# Patient Record
Sex: FEMALE | Race: WHITE | NOT HISPANIC OR LATINO | Employment: UNEMPLOYED | URBAN - METROPOLITAN AREA
[De-identification: names, ages, dates, MRNs, and addresses within clinical notes are randomized per-mention and may not be internally consistent; named-entity substitution may affect disease eponyms.]

---

## 2017-04-13 ENCOUNTER — HISTORICAL (OUTPATIENT)
Dept: LAB | Facility: HOSPITAL | Age: 57
End: 2017-04-13

## 2017-10-20 ENCOUNTER — HISTORICAL (OUTPATIENT)
Dept: LAB | Facility: HOSPITAL | Age: 57
End: 2017-10-20

## 2018-01-23 ENCOUNTER — HISTORICAL (OUTPATIENT)
Dept: CARDIOLOGY | Facility: HOSPITAL | Age: 58
End: 2018-01-23

## 2018-02-20 ENCOUNTER — HOSPITAL ENCOUNTER (OUTPATIENT)
Dept: MEDSURG UNIT | Facility: HOSPITAL | Age: 58
End: 2018-02-21
Attending: INTERNAL MEDICINE | Admitting: INTERNAL MEDICINE

## 2018-08-22 ENCOUNTER — HISTORICAL (OUTPATIENT)
Dept: RADIOLOGY | Facility: HOSPITAL | Age: 58
End: 2018-08-22

## 2018-09-07 ENCOUNTER — HISTORICAL (OUTPATIENT)
Dept: LAB | Facility: HOSPITAL | Age: 58
End: 2018-09-07

## 2018-10-03 ENCOUNTER — HISTORICAL (OUTPATIENT)
Dept: RADIOLOGY | Facility: HOSPITAL | Age: 58
End: 2018-10-03

## 2018-10-12 ENCOUNTER — HISTORICAL (OUTPATIENT)
Dept: SURGERY | Facility: HOSPITAL | Age: 58
End: 2018-10-12

## 2018-10-15 ENCOUNTER — HISTORICAL (OUTPATIENT)
Dept: ANESTHESIOLOGY | Facility: HOSPITAL | Age: 58
End: 2018-10-15

## 2019-07-17 ENCOUNTER — HOSPITAL ENCOUNTER (OUTPATIENT)
Dept: ADMINISTRATIVE | Facility: HOSPITAL | Age: 59
End: 2019-07-18
Attending: INTERNAL MEDICINE | Admitting: INTERNAL MEDICINE

## 2019-08-02 ENCOUNTER — HISTORICAL (OUTPATIENT)
Dept: CARDIOLOGY | Facility: HOSPITAL | Age: 59
End: 2019-08-02

## 2019-10-15 ENCOUNTER — HISTORICAL (OUTPATIENT)
Dept: RADIOLOGY | Facility: HOSPITAL | Age: 59
End: 2019-10-15

## 2019-11-22 ENCOUNTER — HISTORICAL (OUTPATIENT)
Dept: RADIOLOGY | Facility: HOSPITAL | Age: 59
End: 2019-11-22

## 2020-07-10 ENCOUNTER — HISTORICAL (OUTPATIENT)
Dept: ADMINISTRATIVE | Facility: HOSPITAL | Age: 60
End: 2020-07-10

## 2020-07-17 ENCOUNTER — HISTORICAL (OUTPATIENT)
Dept: ADMINISTRATIVE | Facility: HOSPITAL | Age: 60
End: 2020-07-17

## 2020-09-21 ENCOUNTER — HISTORICAL (OUTPATIENT)
Dept: RADIOLOGY | Facility: HOSPITAL | Age: 60
End: 2020-09-21

## 2021-07-20 ENCOUNTER — HISTORICAL (OUTPATIENT)
Dept: RADIOLOGY | Facility: HOSPITAL | Age: 61
End: 2021-07-20

## 2021-07-22 ENCOUNTER — HOSPITAL ENCOUNTER (OUTPATIENT)
Dept: MEDSURG UNIT | Facility: HOSPITAL | Age: 61
End: 2021-07-23
Attending: INTERNAL MEDICINE | Admitting: INTERNAL MEDICINE

## 2022-04-09 ENCOUNTER — HISTORICAL (OUTPATIENT)
Dept: ADMINISTRATIVE | Facility: HOSPITAL | Age: 62
End: 2022-04-09

## 2022-04-25 VITALS
BODY MASS INDEX: 38.67 KG/M2 | OXYGEN SATURATION: 98 % | HEIGHT: 62 IN | DIASTOLIC BLOOD PRESSURE: 76 MMHG | WEIGHT: 210.13 LBS | SYSTOLIC BLOOD PRESSURE: 114 MMHG

## 2022-04-30 NOTE — DISCHARGE SUMMARY
Patient:   Lana Martel            MRN: [id number]            FIN: [id number]               Age:   60 years     Sex:  Female     :  1960   Associated Diagnoses:   None   Author:   Reanna Jung NP      Please see same day H & P as discharge summary.

## 2022-04-30 NOTE — H&P
CHIEF COMPLAINT:  Upper abdominal pain.    HISTORY OF PRESENT ILLNESS:  This is a 58-year-old female patient who was seen and evaluated by Dr. Carlos A Zamora and worked up because of upper abdominal pain.  Patient had ultrasound of the abdomen done on 10/03/2018, which showed cholelithiasis.  Common bile duct is normal size.  No other upper abdominal pathology was noted.  Since patient was symptomatic, patient was referred to me for cholecystectomy.  Patient has multiple medical problems, including hypertensive cardiovascular disease, hyperlipidemia, gastroesophageal reflux disease, coronary artery disease.  She gives a history of supraventricular tachycardia in the past; she underwent cardiac ablation for the same.  Also had coronary artery stent insertion and  angioplasty in the past.    MEDICATIONS:  She is on multiple medications at home, including atorvastatin, Xanax, citalopram, Lasix, lansoprazole, levothyroxine, loratadine, Singulair, nitroglycerin sublingual, potassium chloride, ranolazine, Xarelto, Carafate, trazodone, nebivolol, etc.    PAST HISTORY:  She gives a history of multiple surgical procedures including tonsillectomy, appendectomy, hysterectomy, anterior cruciate ligament repair on the right knee, coronary artery angioplasty and stent insertion, cardiac ablation for supraventricular tachycardia, etc.    ALLERGIES:  SHE IS ALLERGIC TO PENICILLIN, CELEBREX, HYDROCHLOROTHIAZIDE, SULFONAMIDES, COMPAZINE, ETC.      SOCIAL HISTORY:  She does not smoke.  No history of alcohol abuse.  No history of illicit drug use.    FAMILY HISTORY:  Hypertension present.    REVIEW OF SYSTEMS:  RESPIRATORY:  No shortness of breath.   CARDIOVASCULAR:  Known hypertension.  Known to have coronary artery disease.  History of supraventricular tachycardia in the past.     NEURO:  No seizures.   ENDOCRINE:  No diabetes mellitus.  Known to have hypothyroidism.   PSYCHIATRIC:  No complaints.     HEMATOLOGIC:  Because of  Xarelto, she bleeds easily.   MUSCULAR:  She has vague joint pains.   GENITOURINARY: No complaints.   GASTROINTESTINAL:  Patient has upper abdominal pain.  Known to have cholelithiasis.  Also known to have gastroesophageal reflux disease.    PHYSICAL EXAMINATION:  GENERAL:  Condition of the patient is satisfactory.   VITAL SIGNS:  Stable.   HEENT:  No scalp lesion.  Oral cavity and throat normal.     NECK:  Supple.  Thyroid:  No nodules.  Trachea central.  No cervical or supraclavicular nodes noted.  No carotid bruits.  Jugular venous pressure normal.   CHEST:  Air entry equal on both sides.     LUNGS:  Clear.  No rales or wheezing.     HEART:  Regular.  No murmur.  No gallop.     BREASTS:  No lump palpable.  Axilla:  No nodes enlarged.     ABDOMEN:  Soft.  Mild tenderness in the right upper quadrant.  Liver and spleen not enlarged.  No masses palpable.  No area of tenderness.  Good bowel sounds.  No costovertebral angle tenderness.  Hernia sites normal.   EXTREMITIES:  Femoral, popliteal pulses, and pedal pulses feeble but present.  No varicose veins.  No calf muscle tenderness.  Upper extremities normal.  Spine is normal.   NEUROLOGIC:  Status normal.    CLINICAL IMPRESSION:  Chronic cholecystitis with cholelithiasis.    PLAN:  Patient is scheduled for cholecystectomy.        VIJI/MAIDA   DD: 10/15/2018 0711   DT: 10/15/2018 0746  Job # [id number]    cc: SAVANNAH Mcconnell M.D.

## 2022-04-30 NOTE — OP NOTE
Patient:   Lana Martel            MRN: [id number]            FIN: [id number]               Age:   59 years     Sex:  Female     :  1960   Associated Diagnoses:   None   Author:   El Mcmillan MD      Preoperative Diagnosis: Cataract Right eye    Postoperative Diagnosis: Cataract Right eye    Procedure: Phacoemulsification with intaocular lens implantations Right eye    Surgeon: El Mcmillan MD    Assistant: Patience Ferris [facility]    Anesthesia MAC    Complications: None    The patient was brought into the operating suite, where the patient was correctly identified as was the operative eye via timeout.  The patient was prepped and draped in a sterile ophthalmic fashion.  A lid speculum was placed in the operative eye and the microscope was brought into place.  A 1.0mm paracentesis was then made at (12) o'clock.  The anterior chamber was filled with EndoCoat  A (temporal) clear corneal incision was made with a 2.4 mm keratome.  A 6 mm corneal marking ring was used to kate the cornea centered over the visual axis.  A 5.00 mm continuous curvilinear capsulorrhexis was fashioned using a cystotome and microcapsular forceps.  Hydrodissection and hydrodelineation was performed with unpreserved 1% Xylocaine.  The nucleus was then phacoemulsified with the Abbott phacoemulsification hand-piece with a total of (0) EFX.  The cortex was then removed with the I/A hand-piece. The lens model ([id number]) with a power of (21.5) was placed in the capsular bag.  The Healon was then removed from the eye with the I/A hand piece.  The anterior chamber was inflated and the wounds were hydrated with BSS.  The wounds were checked with Weck-Stephany sponges and found to be watertight.  The lid speculum was removed and topical antibiotics were placed on the operative eye.  The patient was brought to PACU in good condition.

## 2022-04-30 NOTE — DISCHARGE SUMMARY
Patient:   Lana Martel            MRN: [id number]            FIN: [id number]               Age:   57 years     Sex:  Female     :  1960   Associated Diagnoses:   Potential stroke; Acute cerebrovascular accident (CVA)   Author:   Tiffany CAZARES, Elizabeth Merritt      Results Review   MRI:   Impression:     Mild multifocal cerebral white matter T2 hyperintensities have  progressed slightly since the previous exam. These probably reflect  changes of chronic ischemia but nonspecific demyelination is not  excluded. No acute abnormality is present.      CTA HEAD AND NECK: Negative study    Carotid ultrasound:  Right with no hemodynamically significant stenosis in internal carotid artery  Left internal carotid artery with less than 50% stenosis  Patent and antegrade vertebral flow.     CT HEAD:   IMPRESSION: No acute abnormalities are identified.         Discharge Information      Discharge Summary Information   Admitted  2018   Discharged  2018   Consulting physician     El Greenwood MD     Admitting diagnosis (r/o CVA)   Discharge diagnosis     Potential stroke (PNED [id number]).     Acute cerebrovascular accident (CVA) (GPI39-OU I63.9).        Physical Examination      Vital Signs (last 24 hrs)_____  Last Charted___________  Temp Oral     36.6 DegC  ( 07:08)  Heart Rate Peripheral   82 bpm  ( 07:08)  Resp Rate         21 br/min  ( 07:08)  SBP      129 mmHg  ( 07:08)  DBP      86 mmHg  ( 07:08)  SpO2      97 %  ( 07:08)  Weight      98.4 kg  ( 11:47)     General:  Alert, oriented X 3, no acute distress.    Skin:  Warm, pink, intact, moist, no rash.    Head:  Normocephalic, atraumatic.    Cardiovascular:  Regular rate and rhythm, No murmur, Normal peripheral perfusion, No edema.    Respiratory:  Lungs are clear to auscultation, respirations are non-labored, breath sounds are equal, Symmetrical chest wall expansion.     Gastrointestinal:  Soft, Nontender, Non distended, Normal bowel sounds.    Musculoskeletal:  Normal ROM, normal strength.    Neurological:  Alert and oriented to person, place, time, and situation, No focal neurological deficit observed, normal speech observed, Cranial nerves II - XII: Intact  Psychiatric:  Cooperative, appropriate mood & affect, normal judgment.           Hospital Course   The patient is a 57-year-old  female with a history of previous TIAs, coronary artery disease status post PTCA, SVT that presented to the ED via EMS due to strokelike symptoms that started about 45 minutes prior.  Symptoms involved tongue and lips tingling with numbness.  Apparently patient was trying to have a bowel movement and had to strain some since she had been constipated.  Other complaints include unsteady gait with left-sided weakness.  She also reported shortness of breath, dizziness and rhinorrhea.  Denied any chest pain or headache.  Patient is anticoagulated with Xarelto.  By the time she presented to the emergency room, her symptoms are completely resolved.  Patient was already evaluated by neurology, not a candidate for TPA secondary to being anticoagulated.  Patient was  admitted to the hospitalist service to complete the CVA workup with MRI, CTA, carotid ultrasound and echocardiogram.  Echocardiogram results are still pending however rest of the test results were essentially negative except for some increase in the T2 hyperintensities.  Patient was evaluated by neurology, will await further recommendations from them before discharging the patient home.  Her symptoms have completely resolved and she is eager to go home at this time.  We gave her aspirin on admission.  Patient is on Xarelto at home.  Will await recommendations with the patient will need aspirin on discharge.  Overall patient's other medical comorbidities have remained stable.  She will be discharged home if cleared by neurology.  Time  spent: 35 minutes           Discharge Plan   Discharge Summary Plan   Discharge Status: improved.     Discharge instructions given: to patient.     Discharge disposition: discharge to home (into the care of family member, self care).     Prescriptions: continue same medications, per med rec sheet.     Orders     Orders   Admit/Transfer/Discharge:  Discharge (Order): Home, if Ok with Dr Greenwood.        CVA vs TIA  Stroke protocol, completed an MRI negative for any acute events.  Some increase in the T2 hypodensity, will await further recommendations from neurology, possible discharge to home later  Therapy consult    Slurred speechpossible TIA; symptoms resolved    HTN  Patient outside the permissive hypertension window, will resume home blood pressure medications    CADs/p stent placement  Patient was on aspirin and Plavix at home, now on Xarelto   ASA was added, will await neurology recommendations if she needs it on discharge    Hypothyroidism  Levothyroxine will be continued      Education and Follow-up   Counseled: patient, family, regarding diagnosis, regarding treatment, regarding medications.     Discharge Planning: Follow up with primary care provider; El Greenwood.

## 2022-04-30 NOTE — ED PROVIDER NOTES
"   Patient:   Lana Bond            MRN: [id number]            FIN: [id number]               Age:   58 years     Sex:  Female     :  1960   Associated Diagnoses:   Chest pain; History of coronary artery disease; Dyspnea   Author:   Peter Muir MD      Basic Information   Time seen: Date & time 2019 11:11:00.   History source: Patient.   Arrival mode: Private vehicle.   History limitation: None.   Additional information: Patient's physician(s): Simin Noonan MD.      History of Present Illness   The patient presents with chest pain, This is a 58-year-old female who presents with chest pain which she attributes to possible reflux, intermittent, for the past few months with shortness of breath starting last week.  Patient does have a history of cardiac stents. and     I, Dr. Muir, assumed care of this patient at 1133.    59 y/o CF with hx of CAD, GERD, HTN, HLD and who is currently on Xarelto, presents to the ED c/o intermittent chest pain radiating to the L shoulder w/ SoB that has been ongoing since last week. Pt reports that she is also experiencing epigastric pain, which she believes is being caused by her reflux. She states that her SoB has been intermittent for the last few months, but was worse this morning while driving to work. Pt states that her SoB comes on randomly but is worse with exertion. She reports that her chest pain lasts between 5-10 mins. Pt is on NTG at home but states that she does not take it due to the way it makes her feel. She notes that she also has a cough but states that it has been ongoing for "months." Previous heart cardiac stent placement in  and has an appointment with Dr. Noonan later in the month. No hx of CHF. Previous TIA x3. .  The onset was Has been ongoing but worse this morning.  The course/duration of symptoms is worsening and fluctuating in intensity.  Location: Generalized chest. Radiating pain: left shoulder. The character of " "symptoms is "Pain".  The degree at onset was moderate.  The degree at maximum was moderate.  The degree at present is none.  The exacerbating factor is none.  The relieving factor is none.  Risk factors consist of coronary artery disease, hypertension and obesity.  Prior episodes: coronary artery disease and gastroesophageal reflux disease.  Therapy today None.  Associated symptoms: shortness of breath.        Review of Systems   Constitutional symptoms:  Weakness, no fever, no chills, no sweats, no fatigue, no decreased activity.    Skin symptoms:  No jaundice, no rash, no pruritus, no abrasions, no breakdown, no burns, no dryness, no petechiae, no lesion.    ENMT symptoms:  No ear pain, no sore throat, no nasal congestion, no sinus pain.    Respiratory symptoms:  Shortness of breath, cough, no orthopnea, no hemoptysis, no stridor, no wheezing.    Cardiovascular symptoms:  Chest pain, central, moderate pain, intermittent, L shoulder, no palpitations, no tachycardia, no syncope, no diaphoresis, no peripheral edema.    Gastrointestinal symptoms:  Abdominal pain, moderate, epigastric, pain, no nausea, no vomiting.    Genitourinary symptoms:  No dysuria, no hematuria.    Musculoskeletal symptoms:  No back pain, no Muscle pain, no Joint pain, no Claudication.    Neurologic symptoms:  No headache, no dizziness, no altered level of consciousness, no numbness, no tingling, no weakness.              Additional review of systems information: All systems reviewed as documented in chart.      Health Status   Allergies:    Allergic Reactions (Selected)  Severity Not Documented  Biaxin- Upset stomach.  CeleBREX- Trouble breathing and swelling.  Compazine- Agitation.  Penicillin- Trouble breathing.  Sulfa drugs- Upset stomach.  Toprol-XL- Hypotension..   Medications:  (Selected)   Documented Medications  Documented  Bystolic 10 mg oral tablet: 10 mg = 1 tab(s), Oral, Daily  Carafate 1 g oral tablet: 1 gm = 1 tab(s), Oral, AC & " HS  Claritin 10 mg oral tablet: 20 mg = 2 tab(s), Oral, Daily  Divigel 1 mg/1 g (0.1%) transdermal gel: 1 linwood, TOP, Daily, to upper thigh  Nitrostat 0.4 mg sublingual tab: 0.4 mg = 1 tab(s), SL, q5min, PRN PRN for chest pain  Ranexa 1000 mg oral tablet, extended release: 1,000 mg = 1 tab(s), Oral, BID  Vitamin D3 5000 intl units oral capsule: 50,000 IntUnit = 10 cap(s), Oral, qSunday, with food  Xarelto 20mg Tablet: 20 mg = 1 tab(s), Oral, qPM  Zofran 8 mg oral tablet: 8 mg = 1 tab(s), Oral, q12hr, PRN PRN as needed for nausea/vomiting  alPRAzolam 0.5 mg oral tablet: 0.5 mg = 1 tab(s), Oral, Once a day (at bedtime), PRN PRN anxiety  atorvastatin 80 mg oral tablet: 80 mg = 1 tab(s), Oral, Daily  cloniDINE 0.1 mg oral tablet: 0.1 mg = 1 tab(s), Oral, TID, PRN PRN hypertension, SBP > 175 or DBP > 95  escitalopram 20 mg oral tablet: 20 mg = 1 tab(s), Oral, Daily  furosemide 40 mg oral tablet: 40 mg = 1 tab(s), Oral, Daily  lansoprazole 30 mg oral DR capsule: 30 mg = 1 cap(s), Oral, Daily  levothyroxine 25 mcg (0.025 mg) oral tablet: 25 mcg = 1 tab(s), Oral, BID  meclizine 25 mg oral tablet: 25 mg = 1 tab(s), Oral, TID, PRN PRN for dizziness  montelukast 10 mg oral TABLET: 10 mg = 1 tab(s), Oral, qPM  potassium chloride 10 mEq oral CAPSULE extended release: 10 mEq = 1 cap(s), Oral, BID  traZODone 100 mg oral tablet: 200 mg = 2 tab(s), Oral, qPM  valsartan 80 mg oral tablet: 80 mg = 1 tab(s), Oral, At Bedtime.      Past Medical/ Family/ Social History   Medical history:    Resolved  Heart murmur (1136801884):  Resolved.  GERD - Gastro-esophageal reflux disease (9815458888):  Resolved.  TIA - Transient ischaemic attack (414841664):  Resolved.  Palpitations (154202261):  Resolved.  EUGENE - Obstructive sleep apnea (7466983611):  Resolved.  HTN - Hypertension (0936864438):  Resolved.  HLD - Hyperlipidemia (014623450):  Resolved.  CAD - Coronary artery disease (9249603467):  Resolved.  Anxiety (99592470):  Resolved.  Depression  (876278842):  Resolved.  SVT - Supraventricular tachycardia (9425030100):  Resolved..   Surgical history:    Tonsillectomy (269978919).  Hysterectomy (551188194).  Appendectomy (241458503).  Foot (14082833).  Eye (675330554).  Ablation (110138929).  PTCA - Percutaneous transluminal coronary angioplasty (4881951288).  ACL repair..   Family history:    No family history items have been selected or recorded..   Social history: Alcohol use: Occasionally, Tobacco use: Denies.      Physical Examination               Vital Signs   Vital Signs   7/17/2019 11:12 CDT      Temperature Oral          36.9 DegC                             Temperature Oral (calculated)             98.42 DegF                             Peripheral Pulse Rate     70 bpm                             Respiratory Rate          18 br/min                             SpO2                      99 %                             Oxygen Therapy            Room air                             Systolic Blood Pressure   146 mmHg  HI                             Diastolic Blood Pressure  84 mmHg  .   Measurements   7/17/2019 11:12 CDT      Weight Dosing             102 kg                             Weight Measured and Calculated in Lbs     224.87 lb                             Weight Estimated          102 kg                             Height/Length Dosing      157 cm                             Height/Length Estimated   157 cm                             Body Mass Index Estimated 41.38 kg/m2  .   Basic Oxygen Information   7/17/2019 11:12 CDT      SpO2                      99 %                             Oxygen Therapy            Room air  .   General:  Alert, no acute distress, obese.    Skin:  Warm, dry, no rash.    Head:  Normocephalic, atraumatic.    Neck:  Supple, trachea midline, no tenderness.    Eye:  Extraocular movements are intact, vision unchanged.    Ears, nose, mouth and throat:  Oral mucosa moist.   Respiratory:  Lungs are clear to auscultation,  respirations are non-labored, breath sounds are equal, Symmetrical chest wall expansion, No wheezing, rales, or rhonchi.    Cardiovascular:  Regular rate and rhythm, No murmur, Normal peripheral perfusion.    Gastrointestinal:  Soft, Nontender, Non distended, Normal bowel sounds, No organomegaly, Obese, Guarding: Negative, Rebound: Negative.    Musculoskeletal:  Normal ROM, normal strength.    Neurological:  Alert and oriented to person, place, time, and situation, No focal neurological deficit observed, normal motor observed.    Psychiatric:  Cooperative, appropriate mood & affect.       Medical Decision Making   Documents reviewed:  Emergency department nurses' notes.   Orders  Launch Orders   Laboratory:  CMP (Order): Stat collect, 7/17/2019 11:13 CDT, Blood, Lab Collect, Print Label By Order Location, 7/17/2019 11:13 CDT  CBC w/ Auto Diff (Order): Stat collect, 7/17/2019 11:13 CDT, Blood, Lab Collect, Print Label By Order Location, 7/17/2019 11:13 CDT  POC BNP iSTAT request (Order): Blood, Stat collect, 7/17/2019 11:13 CDT by Saqib Perez, Lab Collect, Print Label By Order Location  POC iSTAT ER Troponin request (Order): Blood, Stat collect, 7/17/2019 11:13 CDT by Saqib Perez, Lab Collect, Print Label By Order Location  Patient Care:  Saline Lock Insert (Order): 7/17/2019 11:13 CDT  Contact MD for order for Aspirin and NTG. (Order): 7/17/2019 11:13 CDT, Contact MD for order for Aspirin and NTG.  Pulse Oximetry (Order): 7/17/2019 11:13 CDT, Place on pulse oximetry.  Monitor oxygen saturation.  Cardiac Monitoring (Order): 7/17/2019 11:13 CDT, Constant Order, Place on telemetry.  Blood Pressure (Order): 7/17/2019 11:13 CDT, Monitor blood pressure.  Pharmacy:  [facility] (Order): 50 mL, form: Liquid, Oral, Once, first dose 7/17/2019 11:13 CDT, stop date 7/17/2019 11:13 CDT  Radiology:  XR Chest 1 View (Order): Stat, 7/17/2019 11:13 CDT, Chest Pain, None, Patient Bed, Patient  Has IV?, Rad Type, Not Scheduled  Cardiology:  EKG Adult 12 Lead (Order): 7/17/2019 11:13 CDT, Stat, Chest Pain, Patient Bed, Patient Has IV, Standard Precautions, Show to provider upon completion., -1, -1, 7/17/2019 11:13 CDT  Respiratory Therapy:  Oxygen Therapy (Order): 7/17/2019 11:13 CDT, Nasal Cannula, Maintain O2 saturation > 93%., CM Oxygen.   Electrocardiogram:  Time 7/17/2019 11:27:00, rate 73, normal sinus rhythm, No ST-T changes, no ectopy, normal IL & QRS intervals, EP Interp.    Results review:  Lab results : Lab View   7/17/2019 11:55 CDT      POC Troponin              0.00 ng/mL    7/17/2019 11:54 CDT      POC BNP iSTAT             58 pg/mL    7/17/2019 11:40 CDT      Sodium Lvl                140 mmol/L                             Potassium Lvl             4.4 mmol/L                             Chloride                  104 mmol/L                             CO2                       30.0 mmol/L                             Calcium Lvl               9.5 mg/dL                             Glucose Lvl               91 mg/dL                             BUN                       11.0 mg/dL                             Creatinine                0.94 mg/dL                             eGFR-AA                   >60 mL/min/1.73 m2  NA                             eGFR-SHERIF                  >60 mL/min/1.73 m2  NA                             Bili Total                0.4 mg/dL                             Bili Direct               0.10 mg/dL                             Bili Indirect             0.30 mg/dL                             AST                       12 unit/L  LOW                             ALT                       27 unit/L                             Alk Phos                  163 unit/L  HI                             Total Protein             7.3 gm/dL                             Albumin Lvl               3.70 gm/dL                             Globulin                  3.60 gm/dL  HI                              A/G Ratio                 1.0 ratio  LOW                             Troponin-I                <0.02 ng/mL  LOW                             WBC                       10.3 x10(3)/mcL                             RBC                       5.47 x10(6)/mcL  HI                             Hgb                       13.1 gm/dL                             Hct                       43.5 %                             Platelet                  369 x10(3)/mcL                             MCV                       79.5 fL  LOW                             MCH                       23.9 pg  LOW                             MCHC                      30.1 gm/dL  LOW                             RDW                       14.6 %                             MPV                       10.0 fL                             Abs Neut                  5.75 x10(3)/mcL                             Neutro Auto               56 %  NA                             Lymph Auto                32 %  NA                             Mono Auto                 8 %  NA                             Eos Auto                  2 %  NA                             Abs Eos                   0.2 x10(3)/mcL                             Basophil Auto             1 %  NA                             Abs Neutro                5.75 x10(3)/mcL                             Abs Lymph                 3.3 x10(3)/mcL                             Abs Mono                  0.8 x10(3)/mcL                             Abs Baso                  0.1 x10(3)/mcL  .   Radiology results:  Rad Results (ST)  < 12 hrs   Accession: [id number]  Order: XR Chest 1 View  Report Dt/Tm: 07/17/2019 12:29  Report:   CHEST, 1 VIEW     INDICATION: Chest pain.     FINDINGS: Frontal view of the chest was obtained. The cardiac  silhouette is within normal limits for size.    No evidence of lobar  type consolidation, visible pneumothorax, or pleural effusion is seen.  No acute displaced fractures are  visualized.        IMPRESSION:  1. No evidence of lobar type consolidation or acute cardiac  decompensation is visualized.      .      Reexamination/ Reevaluation   Time: 7/17/2019 13:21:00 .   Course: well controlled.      Impression and Plan   Diagnosis   Chest pain (IVJ64-PC R07.9)   History of coronary artery disease (GIU81-XX Z86.79)   Dyspnea (OBQ77-JL R06.00)      Calls-Consults   -  7/17/2019 13:22:00 , Seven NP, okay to admit.    Plan   Condition: Stable.    Disposition: Admit time  7/17/2019 13:23:00, Place in Observation Telemetry Unit.    Counseled: Patient, Regarding diagnosis, Regarding diagnostic results, Regarding treatment plan, Patient indicated understanding of instructions.    Notes: I, Kwasi Winslow, acted solely as a scribe for and in the presence of Dr. Muir who performed the service. , I acknowledged that the documentation on this chart was provided by a scribe on the date of service noted above and that the documentation in the chart accurately reflects work and decisions made by me alone..

## 2022-04-30 NOTE — OP NOTE
Patient:   Lana Martel            MRN: [id number]            FIN: [id number]               Age:   59 years     Sex:  Female     :  1960   Associated Diagnoses:   None   Author:   El Mcmillan MD      Preoperative Diagnosis: Cataract Left eye    Postoperative Diagnosis: Cataract Left eye    Procedure: Phacoemulsification with intaocular lens implantations Left eye    Surgeon: El Mcmillan MD    Assistant: Patience Ferris, [facility]    Anestheisa: MAC    Complications: None    The patient was brought into the operating suite, where the patient was correctly identified as was the operative eye via timeout.  The patient was prepped and draped in a sterile ophthalmic fashion.  A lid speculum was placed in the operative eye and the microscope was brought into place.  A 1.0mm paracentesis was then made at (6) o'clock.  The anterior chamber was filled with Endocoat.  A (temporal) clear corneal incision was made with a 2.4 mm keratome.  A 6 mm corneal marking ring was used to kate the cornea centered over the visual axis.  A 5.00 mm continuous curvilinear capsulorhexis was fashioned using a cystotome and microcapsular forceps.  Hydrodissection and hydrodelineation was performed with upreserved 1% Xylocaine.  The nucleus was then phacoemulsified with the Abbott phacoemulsification hand-piece with a total of (4) EFX.  The cortex was then removed with the I/A hand-piece. The lens model ([id number]) with a power of (24.5) was placed in the capsular bag.  The Helon was then removed from the eye with the I/A hand piece.  The anterior chamber was inflated and the wounds were hydrated with BSS.  The wounds were checked with Weck-Stephany sponges and found to be watertight.  The lid speculum was removed and topical antibiotics were placed on the operative eye.  The patient was brought to PACU in good condition.

## 2022-04-30 NOTE — ED PROVIDER NOTES
"   Patient:   Lana Martel            MRN: [id number]            FIN: [id number]               Age:   60 years     Sex:  Female     :  1960   Associated Diagnoses:   None   Author:   Loyd Leiva MD      Basic Information   Time seen: Date & time 2021 12:20:00.   History source: Patient.   Arrival mode: Private vehicle, walking.   History limitation: None.   Additional information: Patient's physician(s): Simin Noonan MD, Chief Complaint from Nursing Triage Note : Chief Complaint   2021 12:15 CDT      Chief Complaint           CP worsening yesterday. Took nitro yesterday with relief of symptoms. Hx of stent placement and SVT. Radiates down left arm.  .      History of Present Illness   The patient presents with chest pain, 59 yo female presents with chest pain radiating down left arm since this morning. Relieved with nitro. Hx of CAD with stent placement and SVT. ANNELISE Stephens and I, Dr. Leiva, assumed care of this patient at 1552.    61 y/o CF with a history of HTN, HLD, SVT, TIA, and CAD on Xarelto presents to the ED complaining of left-sided chest pain onset yesterday. The pt reports that she has been feeling more SOB on exertion than usual and yesterday, she began experiencing left-sided chest pain that radiated to her left arm. The pt took 1 NTG which helped to relieve some of the pain. Upon exam, the pt complains of mild chest pain. Denies nausea. .  The onset was 1  days ago.  The course/duration of symptoms is constant.  Location: Left chest. Radiating pain: left arm. The character of symptoms is "Pain".  The degree at onset was moderate.  The degree at maximum was moderate.  The degree at present is moderate.  The exacerbating factor is exertion.  The relieving factor is nitroglycerin.  Risk factors consist of coronary artery disease, hypertension and family history of coronary artery disease.  Prior episodes: coronary artery disease.  Therapy today None.  Associated " symptoms: shortness of breath and denies nausea.        Review of Systems   Constitutional symptoms:  No fever, no chills, no sweats, no weakness.    Skin symptoms:  No rash,    Eye symptoms:  No recent vision problems   ENMT symptoms:  No ear pain,    Respiratory symptoms:  Shortness of breath, no orthopnea   Cardiovascular symptoms:  Chest pain, no palpitations   Gastrointestinal symptoms:  No abdominal pain, no nausea, no vomiting, no diarrhea   Genitourinary symptoms:  No dysuria, no hematuria.    Musculoskeletal symptoms:  No back pain, no Muscle pain.    Psychiatric symptoms:  No anxiety, no depression.    Allergy/immunologic symptoms:  No seasonal allergies, no food allergies             Additional review of systems information: All other systems reviewed and otherwise negative.      Health Status   Allergies:    Allergic Reactions (Selected)  Severity Not Documented  Biaxin- Stomach pain and upset stomach.  CeleBREX- Swelling, hives and trouble breathing.  Compazine- Hallucinations and agitation.  Penicillin- Trouble breathing.  Penicillins- Swelling.  Percocet 10/325- Hallucinations.  Percocet 5/325- Hallucinations.  Sulfa drugs- Upset stomach and stomach pain.  Toprol-XL- Hypotension.  Nonallergic Reactions (Selected)  Severity Not Documented  Hydrochlorothiazide- Hypotension..   Medications:  (Selected)   Inpatient Medications  Ordered  citric acid-sodium citrate 334 mg-500 mg/5 mL oral solution: 30 mL, form: Soln, Oral, Once, first dose 08/11/16 14:00:00 CDT, stop date 08/11/16 14:00:00 CDT  Prescriptions  Prescribed  K-Dur 10 oral tablet, extended release: 10 mEq = 1 tab(s), Oral, BID, # 60 tab(s), 0 Refill(s), Pharmacy: [id number]  K-Dur 20 oral tablet, extended release: 20 mEq = 1 tab(s), Oral, BID, with food  One twice a day until all gone then resume your 10 mEq twice a day, # 5 tab(s), 0 Refill(s), Pharmacy: [id number], 157, cm, Height/Length Dosing, 08/02/19  9:22:00 CDT, 96, kg, Weight Dosing, 02...  Xarelto 20mg Tablet: See Instructions, TAKE 1 TABLET BY MOUTH EVERY EVENING, # 30 tab(s), 6 Refill(s), Pharmacy: [facility] [id number]  alPRAzolam 0.5 mg oral tablet: 0.5 mg = 1 tab(s), Oral, Daily, at bedtime, # 90 tab(s), 0 Refill(s), Pharmacy: [facility] [id number]  aspirin 81 mg oral Delayed Release (EC) tablet: 81 mg = 1 tab(s), Oral, Daily, # 30 tab(s), 5 Refill(s), Pharmacy: [facility] [address]  atorvastatin 80 mg oral tablet: See Instructions, TAKE 1 TABLET BY MOUTH DAILY, # 90 tab(s), 1 Refill(s), eRx: [facility] [id number]  escitalopram 20 mg oral tablet: See Instructions, TAKE 1 TABLET BY MOUTH DAILY, # 90 tab(s), 2 Refill(s), Pharmacy: [facility] [address]  lansoprazole 30 mg oral DR capsule: 30 mg = 1 cap(s), Oral, Daily, # 90 cap(s), 3 Refill(s), Pharmacy: [facility] [id number]  montelukast 10 mg oral TABLET: See Instructions, TAKE 1 TABLET BY MOUTH EVERY EVENING, # 30 tab(s), 5 Refill(s), eRx: [facility] [id number]  nebivolol 5 mg oral tablet: 5 mg = 1 tab(s), Oral, Daily, # 90 tab(s), 0 Refill(s), Pharmacy: [facility] [id number]  nitroglycerin 0.4 mg sublingual TAB: See Instructions, DISSOLVE ONE TABLET BY MOUTH EVERY 5 MINUTES AS NEEDED FOR CHEST PAIN, # 275 tab(s), 7 Refill(s), eRx: [facility] [id number]  potassium chloride 10 mEq oral CAPSULE extended release: 10 mEq = 1 cap(s), Oral, BID, take with existing potassium prescription for two days  with food, # 4 cap(s), 0 Refill(s)  ranolazine 1000 mg oral tablet, extended release: 1,000 mg = 1 tab(s), Oral, BID, # 60 tab(s), 6 Refill(s)  sucralfate 1 g oral tablet: 1 gm = 1 tab(s), Oral, QIDACHS, # 360 tab(s), 1 Refill(s), Pharmacy: [facility] [id number]  traZODone 100 mg oral tablet: 200 mg = 2 tab(s), Oral, Once a day (at bedtime), # 180 tab(s), 1 Refill(s), Pharmacy: [facility] [id number]  Documented Medications  Documented  Bystolic 10 mg oral  tablet: 10 mg = 1 tab(s), Oral, Daily  Minivelle 0.05 mg/24 hours twice weekly transdermal film, extended release: TD, Uriarte,W, 0 Refill(s)  Template Non-Formulary Med: 10 mg =, Oral, AC TID, DOMPERIDONE, 0 Refill(s)  Zofran 4 mg oral tablet: 4 mg = 1 tab(s), Oral, Once, PRN PRN nausea/vomiting, # 3 tab(s), 0 Refill(s)  busPIRone 10 mg oral tablet: 20 mg = 2 tab(s), Oral, BID  cloniDINE 0.1 mg oral tablet: 0.1 mg = 1 tab(s), Oral, TID, PRN PRN hypertension, SBP > 175 or DBP > 95  levothyroxine 88 mcg (0.088 mg) oral tablet: Oral, Daily, 0 Refill(s)  loratadine 10 mg oral tablet, disintegratin mg = 2 tab(s), Oral, qPM, # 30 tab(s), 0 Refill(s)  meclizine 12.5 mg oral tablet: 12.5 mg = 1 tab(s), Oral, TID, PRN PRN for nausea/vomiting, # 30 tab(s), 0 Refill(s).      Past Medical/ Family/ Social History   Medical history:    Active  Hypertension (32108235)  GERD (5464506620)  CAD - Coronary artery disease (4624549132)  HLD (hyperlipidemia) ([id number])  Anxiety ([id number])  Sleep apnea (143809923)  Comments:  7/10/2020 CDT 10:17 CDT - Michaela Felix RN  CPAP  Resolved  persistant cough (849268966):  Resolved.  Endometriosis (9500587999):  Resolved.  SVT (88549033):  Resolved.  Asthma ([id number]):  Resolved.  Insomnia (852998928):  Resolved.  Vertigo (3730180363):  Resolved.  Seasonal allergies ([id number]):  Resolved.  Bronchitis (70929944):  Resolved.  CPAP (continuous positive airway pressure) ventilation weaning protocol (0010976117):  Resolved.  TIA (157615388):  Resolved.  Heart murmur (8040944165):  Resolved.  GERD - Gastro-esophageal reflux disease (2971528892):  Resolved.  TIA - Transient ischaemic attack (790988599):  Resolved.  Palpitations (055837086):  Resolved.  EUGENE - Obstructive sleep apnea (3681613046):  Resolved.  HTN - Hypertension (7626151927):  Resolved.  HLD - Hyperlipidemia (258780257):   Resolved.  CAD - Coronary artery disease (9846190026):  Resolved.  Anxiety (56834905):  Resolved.  Depression (556342442):  Resolved.  SVT - Supraventricular tachycardia (6183079665):  Resolved..   Surgical history:    53609 - LT CATARACT W/IOL 1 STA PHACO (Left) on 7/17/2020 at 59 Years.  Comments:  7/17/2020 7:32 Azucena Harmon RN  auto-populated from documented surgical case  98863 - RT CATARACT W/IOL 1 STA PHACO (Right) on 7/10/2020 at 59 Years.  Comments:  7/10/2020 11:12 EDVINT - Susan Alonzo RN  auto-populated from documented surgical case  Cholecystectomy Laparoscopic on 10/15/2018 at 58 Years.  Comments:  10/15/2018 12:49 Nahun Haywood  auto-populated from documented surgical case  Biopsy Gastrointestional on 8/11/2016 at 56 Years.  Comments:  8/11/2016 13:58 Eva Soriano RN  auto-populated from documented surgical case  Esophagogastroduodenoscopy on 8/11/2016 at 56 Years.  Comments:  8/11/2016 13:58 Eva Soriano RN  auto-populated from documented surgical case  91728 - RT SCLERAL BUCKLE (Right) on 8/17/2015 at 55 Years.  Comments:  8/18/2015 16:04 Vanessa Salgado RN  auto-populated from documented surgical case  Left Heart Cath w/COR Angio Ventriculography (None) on 5/14/2015 at 54 Years.  Comments:  5/14/2015 6:53 Lake Grajeda RN  auto-populated from documented surgical case  Bronchoscopy, Diagnostic on 11/22/2013 at 53 Years.  Comments:  11/22/2013 11:38 Ernestina Cutler RRT  auto-populated from documented surgical case  Bronchoscopy with Brushings on 11/22/2013 at 53 Years.  Comments:  11/22/2013 11:38 Ernestina Cutler RRT  auto-populated from documented surgical case  Tonsils and adenoids (445724691).  Hysterectomy and bilateral salpingo-oophorectomy sample (005158785).  R ACL.  Diagnostic lap.  Appendectomy (106625876).  Cardiac Abalation.  Coronary stent site (843240233).  Angioplasty (9083261702).  Hysterectomy  (078685210).  Foot (73046877).  Eye (008652918).  PTCA - Percutaneous transluminal coronary angioplasty (7251118244).  ACL repair..   Family history:    Diabetes mellitus type 2  Father  Sister  Cardiac disease  Father  Mother  Hypertension.  Father  Mother  Sister  Depression.  Mother  Hyperlipidemia.  Mother  .   Social history: Alcohol use: Occasionally, Tobacco use: Denies, Drug use: Denies.      Physical Examination               Vital Signs   Vital Signs   7/22/2021 16:30 CDT      Peripheral Pulse Rate     66 bpm                             Heart Rate Monitored      65 bpm                             Respiratory Rate          19 br/min                             Oxygen Therapy            Room air                             Systolic Blood Pressure   137 mmHg                             Diastolic Blood Pressure  85 mmHg                             Mean Arterial Pressure, Cuff              102 mmHg                             Blood Pressure Location   Left arm                             Blood Pressure Cuff Size  Adult long  .   Measurements   7/22/2021 12:15 CDT      Weight Dosing             102.5 kg                             Weight Measured and Calculated in Lbs     225.97 lb                             Weight Estimated          102.5 kg                             Height/Length Dosing      157 cm                             Height/Length Estimated   157 cm                             Body Mass Index Estimated 41.58 kg/m2  .   Basic Oxygen Information   7/22/2021 16:30 CDT      Oxygen Therapy            Room air  General:  Alert, no acute distress   Neurological:  Alert and oriented to person, place, time, and situation, No focal neurological deficit observed, CN II-XII intact, normal sensory observed, normal motor observed, normal speech observed   Skin:  Warm, pink, intact, moist, no rash   Head:  Normocephalic, atraumatic   Cardiovascular:  Regular rate and rhythm, No murmur, Normal peripheral  perfusion, No edema   Respiratory:  Lungs are clear to auscultation, respirations are non-labored, breath sounds are equal, Symmetrical chest wall expansion.    Musculoskeletal:  Normal ROM, normal strength   Gastrointestinal:  Soft, Nontender, Normal bowel sounds, abdominal distention   Lymphatics:  No lymphadenopathy.   Psychiatric:  Cooperative, appropriate mood & affect, normal judgment      Medical Decision Making   Rationale:  60-year-old presents complaint of chest pain.  Left-sided with radiation down left arm.  History of stent.  Most recent angiography approximately 2 to 3 years ago.  Takes Xarelto daily.  Will obtain labs consistent with evaluation of chest pain.  Plan admission given typical nature of pains..   Documents reviewed:  Emergency department nurses' notes.   Orders  Launch Orders   Laboratory:  POC iSTAT ER Troponin request (Order): Blood, Stat collect, 7/22/2021 12:21 CDT by Albertina Sun, Lab Collect, Print Label By Order Location  Troponin-I (Order): Stat collect, 7/22/2021 12:21 CDT, Blood, Lab Collect, Print Label By Order Location, 7/22/2021 12:21 CDT  CMP (Order): Stat collect, 7/22/2021 12:21 CDT, Blood, Lab Collect, Print Label By Order Location, 7/22/2021 12:21 CDT  CBC w/ Auto Diff (Order): Now collect, 7/22/2021 12:21 CDT, Blood, Lab Collect, Print Label By Order Location, 7/22/2021 12:21 CDT  Radiology:  XR Chest 1 View (Order): Stat, 7/22/2021 12:21 CDT, Chest Pain, None, Patient Bed, Patient Has IV?, Rad Type, Not Scheduled  Cardiology:  EKG (Order): 7/22/2021 12:21 CDT, Stat, Once, 7/22/2021 12:21 CDT, Patient Bed, Patient Has IV, Standard Precautions, -1, -1, 7/22/2021 12:21 CDT, launch Order Profile (Selected)   Inpatient Orders  Ordered  Admit in Outpatient Bedded Status: 07/22/21 16:40:00 CDT, Telemetry with Monitor Tiffany CAZARES, Arpan N, No  Echocardiogram Complete Adult: 07/22/21 16:44:00 CDT, Angina Pectoris (Chest Pain), Stop date 07/22/21 16:44:00 CDT, Patient Bed,  Patient has IV, Standard Precautions  NPO After Midnight: 07/23/21 0:01:00 CDT, CM NPO  Patient Isolation: 07/22/21 16:44:38 CDT, Contact Precautions, Constant Indicator  Patient Isolation: 07/22/21 16:44:38 CDT, Droplet Precautions, Constant Indicator  cloniDINE 0.1 mg oral tablet: 0.1 mg, form: Tab, Oral, TID PRN for hypertension, first dose 07/22/21 16:48:00 CDT, STAT  nebivolol 5 mg oral tablet: 5 mg, form: Tab, Oral, Daily, first dose 07/22/21 16:50:00 CDT, STAT  nitroglycerin 0.4 mg sublingual TAB: 0.4 mg, form: Tab-SL, SL, q5min PRN for chest pain, first dose 07/22/21 16:46:00 CDT, STAT  Ordered (Collected)  Troponin IP 6hr: STAT collect, 07/22/21 17:15:17 CDT, BLOOD, Collected, Stop date 07/22/21 17:15:00 CDT, Lab Collect  Ordered (Dispatched)  COVID-19  IDnow: Stat collect, Nasal, 07/22/21 16:44:00 CDT, Stop date 07/22/21 16:44:00 CDT, Nurse collect  Ordered (Scheduled)  CBC w/ Auto Diff: Routine collect, 07/23/21 5:00:00 CDT, Blood, Stop date 07/23/21 5:00:00 CDT, Lab Collect, Print Label By Order Location, 07/23/21 5:00:00 CDT  CMP: AM Next collect, 07/23/21 5:00:00 CDT, Blood, Stop date 07/23/21 5:00:00 CDT, Lab Collect, Print Label By Order Location, 07/23/21 5:00:00 CDT  Magnesium Level: AM Next collect, 07/23/21 5:00:00 CDT, Blood, Stop date 07/23/21 5:00:00 CDT, Lab Collect, Print Label By Order Location, 07/23/21 5:00:00 CDT  PT: AM Next collect, 07/23/21 5:00:00 CDT, Blood, Stop date 07/23/21 5:00:00 CDT, Lab Collect, Print Label By Order Location, 07/23/21 5:00:00 CDT  PTT: AM Next collect, 07/23/21 5:00:00 CDT, Blood, Stop date 07/23/21 5:00:00 CDT, Lab Collect, in AM, Print Label By Order Location, 07/23/21 5:00:00 CDT  Completed  Automated Diff: NOW collect, 07/22/21 12:28:00 CDT, Blood, Collected, Stop date 07/22/21 12:28:00 CDT, Lab Collect, Print Label By Order Location, 07/22/21 12:21:00 CDT  CBC w/ Auto Diff: NOW collect, 07/22/21 12:28:06 CDT, BLOOD, Collected, Stop date 07/22/21 12:28:00  CDT, Lab Collect  CMP: STAT collect, 21 12:28:06 CDT, BLOOD, Collected, Stop date 21 12:28:00 CDT, Lab Collect  EK21 12:21:00 CDT, Stat, Once, 21 12:21:00 CDT, Patient Bed, Patient Has IV, Standard Precautions, -1, -1, 21 12:21:00 CDT  Estimated Glomerular Filtration Rate: STAT collect, 21 12:28:00 CDT, Blood, Collected, Stop date 21 12:28:00 CDT, Lab Collect, Print Label By Order Location, 21 12:21:00 CDT  Lovenox: 100 mg, form: Injection, Subcutaneous, Once, first dose 21 16:52:00 CDT, stop date 21 16:52:00 CDT, STAT  POC Istat ER Troponin: Blood, Stat collect, Collected, 21 12:57:51 CDT  POC iSTAT ER Troponin request: BLOOD, STAT collect, Collected, 21 12:28:06 CDT, Stop date 21 12:28:00 CDT, Lab Collect, Print Label By Order Location  Troponin-I: STAT collect, 21 12:28:06 CDT, BLOOD, Collected, Stop date 21 12:28:00 CDT, Lab Collect  XR Chest 1 View: Stat, 21 12:21:00 CDT, Chest Pain, None, Patient Bed, Patient Has IV?, Rad Type, Not Scheduled, 21 12:21:00 CDT.    Electrocardiogram:  Time 2021 12:15:00, rate 70, normal sinus rhythm, No ST-T changes, no ectopy, normal [place] & QRS intervals, EP Interp.    Results review:  Lab results : Lab View   2021 13:24 CDT      Est Creat Clearance Ser   54.49 mL/min    2021 12:57 CDT      POC Troponin              0.00 ng/mL    2021 12:28 CDT      Sodium Lvl                138 mmol/L                             Potassium Lvl             5.0 mmol/L                             Chloride                  102 mmol/L                             CO2                       25 mmol/L                             Calcium Lvl               9.0 mg/dL                             Glucose Lvl               96 mg/dL                             BUN                       9.3 mg/dL  LOW                             Creatinine                0.85 mg/dL                              eGFR-AA                   >60  NA                             eGFR-SHERIF                  >60 mL/min/1.73 m2  NA                             Bili Total                0.4 mg/dL                             Bili Direct               0.2 mg/dL                             Bili Indirect             0.20 mg/dL                             AST                       12 unit/L                             ALT                       16 unit/L                             Alk Phos                  148 unit/L                             Total Protein             6.8 gm/dL                             Albumin Lvl               3.4 gm/dL                             Globulin                  3.4 gm/dL                             A/G Ratio                 1.0 ratio  LOW                             Troponin-I                <0.010 ng/mL                             WBC                       9.1 x10(3)/mcL                             RBC                       5.13 x10(6)/mcL                             Hgb                       13.0 gm/dL                             Hct                       41.5 %                             Platelet                  354 x10(3)/mcL                             MCV                       80.9 fL                             MCH                       25.3 pg  LOW                             MCHC                      31.3 gm/dL  LOW                             RDW                       13.6 %                             MPV                       10.9 fL                             Abs Neut                  4.97 x10(3)/mcL                             Neutro Auto               55 %  NA                             Lymph Auto                32 %  NA                             Mono Auto                 8 %  NA                             Eos Auto                  4 %  NA                             Abs Eos                   0.4 x10(3)/mcL                             Basophil Auto             1 %  NA                              Abs Neutro                4.97 x10(3)/mcL                             Abs Lymph                 2.9 x10(3)/mcL                             Abs Mono                  0.7 x10(3)/mcL                             Abs Baso                  0.1 x10(3)/mcL  .   Radiology results:  Rad Results (ST)  < 12 hrs   Accession: [id number]  Order: XR Chest 1 View  Report Dt/Tm: 07/22/2021 12:42  Report:   PORTABLE CHEST X-RAY, ONE FRONTAL VIEW     HISTORY: Chest Pain     COMPARISON:   4/7/2020     FINDINGS:     No focal consolidations, pleural effusions or pneumothoraces.  Cardiomediastinal silhouette within normal limits.  No acute bony pathology.  Surgical clips project over the right upper abdomen.  Soft tissues within normal limits.     IMPRESSION:     No acute thoracic abnormality.  No significant interval change.    .      Impression and Plan   Diagnosis   Unstable angina   Plan   Condition: Stable.    Disposition: Admit time  7/22/2021 16:30:00, Place in Observation Telemetry Unit, Tiffany CAZARES, Arpan ESTRADA    Counseled: Patient, Regarding diagnosis, Regarding diagnostic results, Regarding treatment plan, Patient indicated understanding of instructions.    Notes: I, Shaunna Kebede, acted solely as a scribe for and in the presence of Dr. Leiva who performed the service., I , Loyd Leiva, acknowledge that the documentation on this chart was provided by the scribe on the date of service noted above and that the documentation in the chart accurately reflects work and decisions made by me alone.

## 2022-04-30 NOTE — OP NOTE
PREOPERATIVE DIAGNOSIS:  Chronic cholecystitis with cholelithiasis.    POSTOPERATIVE DIAGNOSIS:  Chronic cholecystitis with cholelithiasis.    OPERATION PERFORMED:  Laparoscopic cholecystectomy.    ANESTHESIA:  General.    PROCEDURE IN DETAIL:  This is a 58-year-old female patient who was brought to the operating room, placed in supine position.  General anesthesia was given.  Conley catheter was introduced.  Abdomen was prepared and draped in usual fashion.  With the patient in Trendelenburg position, a small incision was made at the upper portion of the umbilicus.  Veress needle was introduced through the same to the peritoneal cavity.  CO2 was instilled.  Adequate pressure was obtained.  Subsequently, the Veress needle was removed.  A 10 mm port was introduced into the peritoneal cavity.  Laparoscope was introduced.  Pelvic cavity inspected.  There were plenty of scars noted in the pelvic cavity as well as right lower abdomen.  The patient had a hysterectomy done in the past.  Following this, the patient was placed in reverse Trendelenburg  position.  Upper abdominal ports were introduced under direct vision.  A 10 mm port was introduced through the epigastric area, and two 5 mm ports were introduced through the right side of the abdomen.  Upper abdominal viscera inspected.  Liver appeared normal.  Visualized portions of the stomach, small bowel, and large bowel appeared normal.  Gallbladder was distended.  Fundus was visualized.  No adhesions noted.  Grasping forceps applied to the fundus and neck of the gallbladder.  Dissection was done at the site of the cystic duct and cystic artery.  A lot of fat noted in that area.  This was .  Cystic duct and cystic artery dissected, .  Well identified the critical view of the area obtained.  Common duct was identified.  Cystic duct and cystic artery doubly clipped and severed.  Following this, the gallbladder was removed from the gallbladder fossa  with sharp dissection with Endo Heriberto.  Hemostasis was obtained with diathermy.  Gallbladder was removed through the epigastric port.  Gallbladder had small stone palpated.  Gallbladder fossa thoroughly irrigated with warm saline.  One Surgicel was placed to the gallbladder fossa.  Absolute hemostasis was obtained.  Upper abdominal viscera was further examined.  Following this, the upper abdominal ports were removed under direct vision.  No bleeding noted.  Umbilical port also removed.  Port sites closed in layers.  Fascia approximated with 0 Vicryl interrupted sutures.  Subcutaneous plane approximated with 0 Vicryl interrupted sutures.  Skin approximated with skin clips.  Dressing applied.  She tolerated the procedure well.  She received 900 mg of clindamycin prior to surgery.  She was transferred to recovery room in good condition.        VIJI/MAIDA   DD: 10/15/2018 1339   DT: 10/15/2018 1421  Job # [id number]    cc: SAVANNAH Valadez M.D.

## 2022-04-30 NOTE — ED PROVIDER NOTES
Patient:   Lana Martel            MRN: [id number]            FIN: [id number]               Age:   57 years     Sex:  Female     :  1960   Associated Diagnoses:   Acute cerebrovascular accident (CVA)   Author:   Alysia BUITRAGO MD, Uri BURKS      Basic Information   Time seen: Date & time 2018 11:50:00, Immediately upon arrival.   History source: Patient.   Arrival mode: Ambulance.   History limitation: None.   Additional information: Chief Complaint from Nursing Triage Note : Chief Complaint   2018 11:47 CST      Chief Complaint           30 minutes ago, patient had BM, started to have numbness and tingling to tounge and lip , unsteady gait with weak left leg. tx of TIA's in past. upon arrival still feeling numb and tingling like previous TIA but states weakness better ;   .      History of Present Illness   The patient presents with 56 y/o C female with history of TIA, SVT, CAD presents to ED via EMS due to potential stroke onset 45 minutes ago. Patient states she started having tongue and lips numbness and tingling 45 minutes ago. Per patient, she went to have a BM but she had to strain because she had been constipated. Patient reports unsteady gait with left leg weakness. Patient reports SOB, dizziness, and rhinorrhea. Patient denies chest pain and headache. Patient states she is anticoagulated on Xarelto. .  The onset was 45  minutes ago.  The course/duration of symptoms is improving.  Location: Left face lower extremity. The character of symptoms is weakness, tingling and numbness.  The degree at onset was moderate.  The degree at maximum was moderate.  The degree at present is moderate.  Risk factors consist of anticoagulated.  The patient's dominant hand is unknown.  Prior episodes: rare.  Therapy today: emergency medical services.  Associated symptoms: dizziness.  Baseline status: ambulatory.   Additional history: none.        Review of Systems   Constitutional symptoms:   No fever, no chills, no sweats, no weakness.    Skin symptoms:  No rash,    Eye symptoms:  No recent vision problems,    ENMT symptoms:  rhinorrhea, No ear pain,    Respiratory symptoms:  Shortness of breath, No orthopnea,    Cardiovascular symptoms:  No chest pain, no palpitations.    Gastrointestinal symptoms:  No abdominal pain, no nausea, no vomiting, no diarrhea.    Genitourinary symptoms:  No dysuria, no hematuria.    Musculoskeletal symptoms:  No back pain, no Muscle pain.    Neurologic symptoms:  Dizziness, numbness and tingling to tongue and lips, unsteady gait with left leg weakness.    Psychiatric symptoms:  No anxiety, no depression.    Allergy/immunologic symptoms:  No seasonal allergies, no food allergies.              Additional review of systems information: All other systems reviewed and otherwise negative.      Health Status   Allergies:    Allergic Reactions (Selected)  Severity Not Documented  Biaxin- No reactions were documented.  CeleBREX- No reactions were documented.  Compazine- No reactions were documented.  Penicillins- No reactions were documented.  Sulfa drugs- No reactions were documented.  Toprol-XL- No reactions were documented.  Nonallergic Reactions (Selected)  Severity Not Documented  Hydrochlorothiazide- No reactions were documented..   Medications:  (Selected)   Inpatient Medications  Ordered  IVF Normal Saline NS Bolus 250ml 250 mL: 250 mL, 250 mL, IV, 250 mL/hr, start date 02/20/18 11:44:00 CST, bolus dose: 250 mL  IVF Normal Saline NS Infusion 1,000 mL: 1,000 mL, 1,000 mL, IV, 75 mL/hr, start date 02/20/18 11:44:00 CST  citric acid-sodium citrate 334 mg-500 mg/5 mL oral solution: 30 mL, form: Soln, Oral, Once, first dose 08/11/16 14:00:00 CDT, stop date 08/11/16 14:00:00 CDT  Prescriptions  Prescribed  K-Dur 10 oral tablet, extended release: 10 mEq = 1 tab(s), Oral, BID, # 60 tab(s), 0 Refill(s), Pharmacy: [facility] [id number]  Xarelto 20mg Tablet: See Instructions, TAKE  1 TABLET BY MOUTH EVERY EVENING, # 30 tab(s), 6 Refill(s), Pharmacy: [facility] [id number]  alPRAzolam 0.5 mg oral tablet: 0.5 mg = 1 tab(s), Oral, Daily, at bedtime, # 90 tab(s), 0 Refill(s), called to pharmacy (Rx)  atorvastatin 80 mg oral tablet: See Instructions, TAKE 1 TABLET BY MOUTH DAILY, # 30 tab(s), 5 Refill(s), eRx: [facility] [id number]  escitalopram 20 mg oral tablet: See Instructions, TAKE 1 TABLET BY MOUTH DAILY, # 90 tab(s), 2 Refill(s), Pharmacy: [facility]  furosemide 40 mg oral tablet: See Instructions, TAKE 1 TABLET BY MOUTH DAILY, # 30 tab(s), 2 Refill(s), eRx: [facility] [id number]  lansoprazole 30 mg oral DR capsule: 30 mg = 1 cap(s), Oral, Daily, # 90 cap(s), 3 Refill(s), Pharmacy: [facility] [id number]  levothyroxine 25 mcg (0.025 mg) oral tablet: 25 mcg = 1 tab(s), Oral, Daily, # 90 tab(s), 4 Refill(s), other reason (Rx)  montelukast 10 mg oral TABLET: See Instructions, TAKE 1 TABLET BY MOUTH EVERY EVENING, # 30 tab(s), 5 Refill(s), eRx: [facility] [id number]  nebivolol 5 mg oral tablet: 5 mg = 1 tab(s), Oral, Daily, # 90 tab(s), 3 Refill(s)  nitroglycerin 0.4 mg sublingual TAB: See Instructions, DISSOLVE ONE TABLET BY MOUTH EVERY 5 MINUTES AS NEEDED FOR CHEST PAIN, # 275 tab(s), 7 Refill(s), eRx: [facility] [id number]  ranolazine 1000 mg oral tablet, extended release: 1,000 mg = 1 tab(s), Oral, BID, # 60 tab(s), 6 Refill(s)  sucralfate 1 g oral tablet: 1 gm = 1 tab(s), Oral, QIDACHS, # 360 tab(s), 1 Refill(s), Pharmacy: [facility] [address]  traZODone 100 mg oral tablet: 200 mg = 2 tab(s), Oral, Once a day (at bedtime), # 180 tab(s), 3 Refill(s), Pharmacy: [facility] [id number]  valsartan 80 mg oral tablet: See Instructions, 1 at bedtime only for now due to dizziness and low bp during the day, # 90 tab(s), 3 Refill(s), Pharmacy: [facility] [address]  Documented Medications  Documented  Divigel 1 mg/1 g (0.1%) transdermal gel: 1  EA, TOP, Daily, 0 Refill(s)  Vitamin D3 5000 intl units oral capsule: 5,000 IntUnit = 1 cap(s), Oral, q7day, with food, # 100 cap(s), 0 Refill(s)  meclizine 25 mg oral tablet: 25 mg = 1 tab(s), Oral, TID, PRN PRN as needed for dizziness, # 30 tab(s), 0 Refill(s).      Past Medical/ Family/ Social History   Medical history:    Active  Hypertension ([id number])  GERD (2190369235)  CAD - Coronary artery disease (1921651409)  HLD (hyperlipidemia) ([id number])  Anxiety ([id number])  Sleep apnea (037315517)  Resolved  persistant cough (474757404):  Resolved.  Endometriosis (4074806237):  Resolved.  SVT (23380312):  Resolved.  Able to lie down (893487596):  Resolved.  Asthma ([id number]):  Resolved.  Insomnia (590966869):  Resolved.  Vertigo (8129935690):  Resolved.  Seasonal allergies ([id number]):  Resolved.  Bronchitis (81793212):  Resolved.  CPAP (continuous positive airway pressure) ventilation weaning protocol (1609186204):  Resolved.  TIA (964207024):  Resolved., Reviewed as documented in chart.   Surgical history:    Biopsy Gastrointestional on 8/11/2016 at 56 Years.  Comments:  8/11/2016 13:58 - Eva Barrera RN  auto-populated from documented surgical case  Esophagogastroduodenoscopy on 8/11/2016 at 56 Years.  Comments:  8/11/2016 13:58 - Eav Barrera RN  auto-populated from documented surgical case  [id number] - RT SCLERAL BUCKLE (Right) on 8/17/2015 at 55 Years.  Comments:  8/18/2015 16:04 - Vanessa Sterling RN  auto-populated from documented surgical case  Left Heart Cath w/COR Angio Ventriculography (None) on 5/14/2015 at 54 Years.  Comments:  5/14/2015 06:53 - Rosangela RN, [place]  auto-populated from documented surgical case  Bronchoscopy, Diagnostic on 11/22/2013 at 53 Years.  Comments:  11/22/2013 11:38 - Isidro MOSCOSO , Ernestina BURKS.  auto-populated from documented surgical case  Bronchoscopy with Brushings  on 11/22/2013 at 53 Years.  Comments:  11/22/2013 11:38 - Isidro MOSCOSO , Ernestina JOHNS  auto-populated from documented surgical case  Tonsils and adenoids ([id number]).  Hysterectomy and bilateral salpingo-oophorectomy sample (798539211).  R ACL.  Diagnostic lap.  Appendectomy (003463920).  Cardiac Abalation.  Coronary stent site (432242831).  Angioplasty (1304773651)., Reviewed as documented in chart.   Family history:    Diabetes mellitus type 2  Father  Sister  Cardiac disease  Father  Mother  Hypertension.  Father  Mother  Sister  Depression.  Mother  Hyperlipidemia.  Mother  , Reviewed as documented in chart.   Social history: Alcohol use: Denies, Tobacco use: Denies, Drug use: Denies.      Physical Examination               Vital Signs   Vital Signs   2/20/2018 12:15 CST      Peripheral Pulse Rate     86 bpm                             Heart Rate Monitored      86 bpm                             Respiratory Rate          17 br/min                             SpO2                      100 %                             Oxygen Therapy            Room air                             Systolic Blood Pressure   174 mmHg  HI                             Diastolic Blood Pressure  102 mmHg  HI                             Mean Arterial Pressure, Cuff              126 mmHg    2/20/2018 12:00 CST      Heart Rate Monitored      85 bpm                             Respiratory Rate          14 br/min                             Systolic Blood Pressure   166 mmHg  HI                             Diastolic Blood Pressure  81 mmHg                             Mean Arterial Pressure, Cuff              109 mmHg    2/20/2018 11:47 CST      Peripheral Pulse Rate     90 bpm                             Respiratory Rate          20 br/min                             SpO2                      97 %                             Oxygen Therapy            Room air                             Systolic Blood Pressure   182 mmHg  HI                              Diastolic Blood Pressure  112 mmHg  HI  .   Measurements   2/20/2018 11:47 CST      Weight Dosing             98.4 kg                             Weight Measured           98.4 kg                             Weight Measured and Calculated in Lbs     216.93 lb  .   Basic Oxygen Information   2/20/2018 12:15 CST      SpO2                      100 %                             Oxygen Therapy            Room air    2/20/2018 11:47 CST      SpO2                      97 %                             Oxygen Therapy            Room air  .   General:  Alert, no acute distress.    Skin:  Warm, pink, intact, moist, no rash.    Head:  Normocephalic, atraumatic.    Cardiovascular:  Regular rate and rhythm, No murmur, Normal peripheral perfusion, No edema.    Respiratory:  Lungs are clear to auscultation, respirations are non-labored, breath sounds are equal, Symmetrical chest wall expansion.    Gastrointestinal:  Soft, Nontender, Non distended, Normal bowel sounds.    Musculoskeletal:  Normal ROM, normal strength.    Neurological:  Alert and oriented to person, place, time, and situation, No focal neurological deficit observed, normal sensory observed, normal motor observed, normal speech observed, normal coordination observed, Cranial nerves II - XII: Facial droop slight, Otherwise cranial nerves are intact.    Lymphatics:  No lymphadenopathy.   Psychiatric:  Cooperative, appropriate mood & affect, normal judgment.       Medical Decision Making   Documents reviewed:  Emergency department nurses' notes, emergency department records.    Orders  Laboratory    POC iStat PT/INR request, Uri Hatfield III, MD, 02/20/18, 11:44, Completed    POC iSTAT ER Troponin request, Uri Hatfield III, MD, 02/20/18, 11:44, Completed    POC ISTAT Chem8 Request:, Uri Hatfield III, MD, 02/20/18, 11:44, Completed    CBC w/ Auto Diff, Uri Hatfield III, MD, 02/20/18, 11:44, Completed    CMP, Uri Hatfield III, MD, 02/20/18,  11:44, Completed    Automated Diff, Uri Hatfield III, MD, 02/20/18, 12:02, Completed    POC iSTAT PTINR, ER, Physician, 02/20/18, 12:05, Completed    Point of Care iSTAT Chem8, ER, Physician, 02/20/18, 12:05, Completed    POC Istat ER Troponin, ER, Physician, 02/20/18, 12:15, Completed    Estimated Glomerular Filtration Rate, Uri Hatfield III, MD, 02/20/18, 12:30, Completed    PTT, Uri Hatfield III, MD, 02/20/18, 11:44, Ordered    INR - Protime, Uri Hatfield III, MD, 02/20/18, 11:44, Ordered  EKG / Respiratory / Ancillary    EKG Adult 12 Lead, Uri Hatfield III, MD, 02/20/18, 11:44, Ordered    Oxygen Therapy, Uri Hatfield III, MD, 02/20/18, 11:44, Ordered  CT / MRI / Ultrasound    CT Head W/O Contrast, Uri Hatfield III, MD, 02/20/18, 11:44, Completed.   Electrocardiogram:  Time 2/20/2018 11:58:00, rate 86, normal sinus rhythm, No ST-T changes, no ectopy, normal HI & QRS intervals, EP Interp.    Results review:  Lab results : Lab View   2/20/2018 12:03 CST      POC Troponin              0.00 ng/mL    2/20/2018 12:00 CST      POC Sodium                138 mmol/L                             POC Potassium             3.8 mmol/L                             POC Chloride              98 mmol/L                             POC Ion Calcium           1.11 mmol/L  LOW                             POC Glucose               120 mg/dL  HI                             POC BUN                   8.0 mg/dL                             POC Creatinine            0.8 mg/dL                             POC AGAP                  16.0  NA                             POC Hb                    13.9 mg/dL                             POC Hct                   41.0 %                             POC TCO2                  29.0 mmol/L  HI    2/20/2018 11:58 CST      Sodium Lvl                136 mmol/L                             Potassium Lvl             3.8 mmol/L                             Chloride                   101 mmol/L                             CO2                       28.0 mmol/L                             Calcium Lvl               8.5 mg/dL                             Glucose Lvl               120 mg/dL  HI                             BUN                       9.0 mg/dL                             Creatinine                0.83 mg/dL                             eGFR-AA                   >60 mL/min/1.73 m2  NA                             eGFR-SHERIF                  >60 mL/min/1.73 m2  NA                             Bili Total                0.4 mg/dL                             Bili Direct               0.10 mg/dL                             Bili Indirect             0.30 mg/dL                             AST                       11 unit/L  LOW                             ALT                       16 unit/L                             Alk Phos                  154 unit/L  HI                             Total Protein             7.2 gm/dL                             Albumin Lvl               3.30 gm/dL  LOW                             Globulin                  3.90 gm/dL  HI                             A/G Ratio                 0.8 ratio  LOW                             POC PT                    25.8 second(s)  HI                             POC INR                   2.2                             WBC                       7.5 x10(3)/mcL                             RBC                       5.09 x10(6)/mcL                             Hgb                       13.2 gm/dL                             Hct                       42.1 %                             Platelet                  291 x10(3)/mcL                             MCV                       82.7 fL                             MCH                       25.9 pg  LOW                             MCHC                      31.4 gm/dL  LOW                             RDW                       14.5 %                             MPV                       9.7 fL                              Abs Neut                  4.36 x10(3)/mcL                             Neutro Auto               58 %  NA                             Lymph Auto                32 %  NA                             Mono Auto                 7 %  NA                             Eos Auto                  2 %  NA                             Abs Eos                   0.1 x10(3)/mcL                             Basophil Auto             1 %  NA                             Abs Neutro                4.36 x10(3)/mcL                             Abs Lymph                 2.4 x10(3)/mcL                             Abs Mono                  0.6 x10(3)/mcL                             Abs Baso                  0.1 x10(3)/mcL  .   Head Computed Tomography:  Time reported 2/20/2018 11:56:00,            * Final Report *    Reason For Exam  CODE FAST;Other (please specify)    Radiology Report  CT of the head without contrast only     Clinical history is slurred speech, unsteady gait     This is compared to a previous study from 9/7/2016     The ventricles are of normal size and shape there is no shift of the  midline noted. There are no extra-axial fluid collections or areas  consistent with hemorrhage noted. No masses are seen. no acute  infarcts are noted.     IMPRESSION: No acute abnormalities are identified.       Signature Line  Electronically Signed By: Donis CAZARES, Anton NEWTON  Date/Time Signed: 02/20/2018 11:56    .       Reexamination/ Reevaluation   Time: 2/20/2018 13:16:00 .   Assessment: NIH stroke scale of 3 secondary to slight facial droop and slight numbness in drift her right leg patient with previous TIAs that resolved completely although she states that one was not as bad as this in one was patient on Topher toe with INR 2.5 not a candidate for TPA did discuss the case with Dr. Greenwood who evaluated patient in the emergency room will start on low-dose aspirin and admit for further workup.      Procedure   Critical  care note   Total time: 40 minutes spent engaged in work directly related to patient care and/ or available for direct patient care.   Critical condition(s) addressed for impending deterioration include: central nervous system.   Management: bedside assessment, Interpretation (blood pressure, cardiac output measures).      Impression and Plan   Diagnosis   Acute cerebrovascular accident (CVA) (YRW32-QA I63.9)   Plan   Condition: Improved, Stable.    Disposition: Admit time  2/20/2018 13:22:00, Admit to Inpatient Unit.    Counseled: Patient, Family, Regarding diagnosis, Regarding diagnostic results, Regarding treatment plan, Regarding prescription, Patient indicated understanding of instructions, Family understood.    Notes: I, Andrea Katz, acted solely as a scribe for and in the presence of Dr. Hatfield who performed the service.  ,       This scribes note accurately reflects the work done by me I have reviewed the note and personally performed a history and physical and agree with all the documentation and findings.

## 2022-04-30 NOTE — H&P
Patient:   Lana Martel            MRN: [id number]            FIN: [id number]               Age:   57 years     Sex:  Female     :  1960   Associated Diagnoses:   None   Author:   Elizabeth Allen MD      Basic Information   Source of history:  Self.    Present at bedside:  Family member.    Referral source:  Emergency department.    History limitation:  None.       Chief Complaint   2018 11:47 CST      30 minutes ago, patient had BM, started to have numbness and tingling to tounge and lip , unsteady gait with weak left leg. tx of TIA's in past. upon arrival still feeling numb and tingling like previous TIA but states weakness better ;         History of Present Illness   The patient is a 57-year-old  female with a history of previous TIAs, coronary artery disease status post PTCA, SVT that presented to the ED via EMS due to strokelike symptoms that started about 45 minutes prior.  Symptoms involved tongue and lips tingling with numbness.  Apparently patient was trying to have a bowel movement and had to strain some since she had been constipated.  Other complaints include unsteady gait with left-sided weakness.  She also reported shortness of breath, dizziness and rhinorrhea.  Denied any chest pain or headache.  Patient is anticoagulated with Xarelto.  By the time she presented to the emergency room, her symptoms are completely resolved.  Patient was already evaluated by neurology, not a candidate for TPA secondary to being anticoagulated.  Patient will be admitted to the hospitalist service to complete the CVA workup with MRI, CTA, carotid ultrasound and echocardiogram.         Review of Systems   Except as documented above, all other systems reviewed and negative      Health Status   Allergies:    Allergies (7) Active Reaction  Biaxin None Documented  CeleBREX None Documented  Compazine None Documented  hydrochlorothiazide None Documented  penicillins None  Documented  sulfa drugs None Documented  Toprol-XL None Documented        Histories   Past Medical History: Coronary artery disease, hyperlipidemia, hypertension, asthma, vertigo, sleep apnea, anxiety/depression, endometriosis  Family History: family history reviewed and non-contributory to present medical condition   Procedure history: PTCA, bronchoscopy with biopsy, tonsillectomy, adenoidectomy, total hysterectomy, right ACL repair, diagnostic laparotomy, appendectomy, cardiac ablation  Social history: Negative ×3       Physical Examination      Vital Signs (last 24 hrs)_____  Last Charted___________  Heart Rate Peripheral   81 bpm  (FEB 20 12:30)  Resp Rate         13 br/min  (FEB 20 12:30)  SBP      H 159mmHg  (FEB 20 12:30)  DBP      H 101mmHg  (FEB 20 12:30)  SpO2      100 %  (FEB 20 12:30)  Weight      98.4 kg  (FEB 20 11:47)     General:  Alert, oriented X 3, no acute distress.    Skin:  Warm, pink, intact, moist, no rash.    Head:  Normocephalic, atraumatic.    Cardiovascular:  Regular rate and rhythm, No murmur, Normal peripheral perfusion, No edema.    Respiratory:  Lungs are clear to auscultation, respirations are non-labored, breath sounds are equal, Symmetrical chest wall expansion.    Gastrointestinal:  Soft, Nontender, Non distended, Normal bowel sounds.    Musculoskeletal:  Normal ROM, normal strength.    Neurological:  Alert and oriented to person, place, time, and situation, No focal neurological deficit observed, normal speech observed, Cranial nerves II - XII: Intact  Lymphatics:  No lymphadenopathy.   Psychiatric:  Cooperative, appropriate mood & affect, normal judgment.           Review / Management   Results review:     Labs (Last four charted values)  Glucose              H 120 (FEB 20)   PT                   H 21.6 (FEB 20)   INR                  H 1.82 (FEB 20)   PTT                  H 41.6 (FEB 20) .    Laboratory Results   Today's Lab Results : PowerNote Discrete Results   2/20/2018  12:03 CST      POC Troponin              0.00 ng/mL    2/20/2018 12:00 CST      POC TCO2                  29.0 mmol/L  HI                             POC Hb                    13.9 mg/dL                             POC Hct                   41.0 %                             POC Sodium                138 mmol/L                             POC Potassium             3.8 mmol/L                             POC Chloride              98 mmol/L                             POC Ion Calcium           1.11 mmol/L  LOW                             POC Glucose               120 mg/dL  HI                             POC BUN                   8.0 mg/dL                             POC Creatinine            0.8 mg/dL                             POC AGAP                  16.0  NA    2/20/2018 11:58 CST      WBC                       7.5 x10(3)/mcL                             RBC                       5.09 x10(6)/mcL                             Hgb                       13.2 gm/dL                             Hct                       42.1 %                             Platelet                  291 x10(3)/mcL                             MCV                       82.7 fL                             MCH                       25.9 pg  LOW                             MCHC                      31.4 gm/dL  LOW                             RDW                       14.5 %                             MPV                       9.7 fL                             Abs Neut                  4.36 x10(3)/mcL                             Neutro Auto               58 %  NA                             Lymph Auto                32 %  NA                             Mono Auto                 7 %  NA                             Eos Auto                  2 %  NA                             Abs Eos                   0.1 x10(3)/mcL                             Basophil Auto             1 %  NA                             Abs Neutro                4.36 x10(3)/mcL                              Abs Lymph                 2.4 x10(3)/mcL                             Abs Mono                  0.6 x10(3)/mcL                             Abs Baso                  0.1 x10(3)/mcL                             PT                        21.6 second(s)  HI                             POC PT                    25.8 second(s)  HI                             INR                       1.82  HI                             POC INR                   2.2                             PTT                       41.6 second(s)  HI                             Sodium Lvl                136 mmol/L                             Potassium Lvl             3.8 mmol/L                             Chloride                  101 mmol/L                             CO2                       28.0 mmol/L                             Calcium Lvl               8.5 mg/dL                             Glucose Lvl               120 mg/dL  HI                             BUN                       9.0 mg/dL                             Creatinine                0.83 mg/dL                             eGFR-AA                   >60 mL/min/1.73 m2  NA                             eGFR-SHERIF                  >60 mL/min/1.73 m2  NA                             Bili Total                0.4 mg/dL                             Bili Direct               0.10 mg/dL                             Bili Indirect             0.30 mg/dL                             AST                       11 unit/L  LOW                             ALT                       16 unit/L                             Alk Phos                  154 unit/L  HI                             Total Protein             7.2 gm/dL                             Albumin Lvl               3.30 gm/dL  LOW                             Globulin                  3.90 gm/dL  HI                             A/G Ratio                 0.8 ratio  LOW        CT HEAD:   IMPRESSION: No acute abnormalities are  identified.            Impression and Plan   Education and Follow-up:       Counseled: Patient, Regarding diagnosis, Regarding treatment, Regarding medications.    CVA vs TIA  Stroke protocol, follow-up on results  Neurology consulted, and Dr. Greenwood has already seen the patient.  We will add aspirin to her anticoagulation and she is not a candidate for TPA because of being anticoagulated at this time.  Aspirin will be added to the anticoagulation.  Appreciate Dr. Greenwood's assistance  Neuro checks every 4 hours  Therapy consult  Permissive hypertension, with as needed blood pressure meds per CVA parameters of pressures over 220/110      Slurred speechpossible TIA; symptoms resolved    HTN  Routine vital signs every 4 hours  Permissive hypertension in light of stroke workup     CADs/p stent placement  Telemetry monitoring  Patient was on aspirin and Plavix at home, now on Xarelto   ASA will be added    Hypothyroidism  Levothyroxine will be continued

## 2022-05-02 NOTE — HISTORICAL [facility] CERNER
This is a historical note converted from [place]. Formatting and pictures may have been removed.  Please reference [place] for original formatting and attached multimedia. Reason for consult: Potential strokes in the ER  ?  HPI: 57-year-old woman anticoagulated with an INR of 2.2. ?Presents with stroke symptoms including numbness of the lips decreased hearing and some right-sided weakness leg more than arm.  ?  I spoke to the ER physician perhaps 1 hour ago. ?I saw the patient about 5 minutes after I was summoned.  ?  Her symptoms are already starting to clear and since she was anticoagulated we together felt she was not an ideal TPA candidate.  ?  Review of systems: She has had similar spells in the past but they were somewhat different. ?She has had left-sided numbness symptoms with past TIAs over the recent years she does not recall ever having right lip numbness with the spell  ?  She was on dual antiplatelets about 3 years ago when I initially met her with what I thought was a right brain TIA. ?I recommended anticoagulation back then. ?To take aspirin and Plavix  ?  Lately she is on either Eliquis or Xarelto. ?She is not on antiplatelet related.  ?  Other meds include antihypertensives  ?  She is not diabetic. ?She does not smoke. ?She denies history of cancer. ?No recent reported fever trauma or seizures. ?No reported bleeding or bruising no reported orthopedic problems  ?  Exam: She is alert she is appropriate speech and language are normal  ?  Visual fields are normal there is no nystagmus there is no ptosis her hearing is impaired on the right side she feels like she is in a tunnel with her hearing  ?  Motor exam in the upper extremities appears normal finger-nose testing shows normal coordination  ?  Right lower extremity raises normally off the bed but earlier she had some weakness according to the ER doctor her light touch sensation was impaired earlier but now it is normal in the right lower  extremity  ?  Reflexes unremarkable  ?  Plantars unremarkable  ?  Head CT images reviewed and unremarkable  ?  Other labs nothing noteworthy  ?  Impression:  1. ?Possible minor brainstem stroke or TIA that is clearing  2. ?She is anticoagulated so not a TPA candidate plus her symptoms are so mild I would not be terribly inclined to give it even if she were not anticoagulated  ?  Recommendations:  1. ?Add low-dose aspirin to her anticoagulant.  2. ?She will be admitted in the full stroke workup will be attempted. ?She is claustrophobic so I hope she can tolerate at least a plain brain MRI. ?We will get CT angiogram is been in the past large vessels have been opened.  ?  I will reassess her clinically tomorrow.  ?  Discussed the issues with the emergency room doctor and the patients sister.  ?  Apologies for any errors in electronic transcription  ?

## 2022-05-09 ENCOUNTER — HOSPITAL ENCOUNTER (EMERGENCY)
Facility: HOSPITAL | Age: 62
Discharge: HOME | End: 2022-05-09
Attending: INTERNAL MEDICINE
Payer: COMMERCIAL

## 2022-05-09 VITALS
OXYGEN SATURATION: 95 % | HEIGHT: 62 IN | HEART RATE: 77 BPM | TEMPERATURE: 99 F | WEIGHT: 226 LBS | RESPIRATION RATE: 17 BRPM | DIASTOLIC BLOOD PRESSURE: 77 MMHG | SYSTOLIC BLOOD PRESSURE: 149 MMHG | BODY MASS INDEX: 41.59 KG/M2

## 2022-05-09 DIAGNOSIS — R06.02 SHORTNESS OF BREATH: ICD-10-CM

## 2022-05-09 DIAGNOSIS — R06.00 DYSPNEA, UNSPECIFIED TYPE: Primary | ICD-10-CM

## 2022-05-09 LAB
ALBUMIN SERPL-MCNC: 3 GM/DL (ref 3.4–4.8)
ALBUMIN/GLOB SERPL: 0.9 RATIO (ref 1.1–2)
ALP SERPL-CCNC: 120 UNIT/L (ref 40–150)
ALT SERPL-CCNC: 16 UNIT/L (ref 0–55)
AST SERPL-CCNC: 10 UNIT/L (ref 5–34)
BASOPHILS # BLD AUTO: 0.06 X10(3)/MCL (ref 0–0.2)
BASOPHILS NFR BLD AUTO: 0.8 %
BILIRUBIN DIRECT+TOT PNL SERPL-MCNC: 0.1 MG/DL (ref 0–0.5)
BILIRUBIN DIRECT+TOT PNL SERPL-MCNC: 0.1 MG/DL (ref 0–0.8)
BILIRUBIN DIRECT+TOT PNL SERPL-MCNC: 0.2 MG/DL
BUN SERPL-MCNC: 14 MG/DL (ref 9.8–20.1)
CALCIUM SERPL-MCNC: 8.8 MG/DL (ref 8.4–10.2)
CHLORIDE SERPL-SCNC: 105 MMOL/L (ref 98–107)
CO2 SERPL-SCNC: 25 MMOL/L (ref 23–31)
CREAT SERPL-MCNC: 0.75 MG/DL (ref 0.55–1.02)
EOSINOPHIL # BLD AUTO: 0.27 X10(3)/MCL (ref 0–0.9)
EOSINOPHIL NFR BLD AUTO: 3.6 %
ERYTHROCYTE [DISTWIDTH] IN BLOOD BY AUTOMATED COUNT: 15.8 % (ref 11.5–17)
FLUAV AG UPPER RESP QL IA.RAPID: NOT DETECTED
FLUBV AG UPPER RESP QL IA.RAPID: NOT DETECTED
GLOBULIN SER-MCNC: 3.4 GM/DL (ref 2.4–3.5)
GLUCOSE SERPL-MCNC: 133 MG/DL (ref 82–115)
HCT VFR BLD AUTO: 42.1 % (ref 37–47)
HGB BLD-MCNC: 12.9 GM/DL (ref 12–16)
IMM GRANULOCYTES # BLD AUTO: 0.09 X10(3)/MCL (ref 0–0.02)
IMM GRANULOCYTES NFR BLD AUTO: 1.2 % (ref 0–0.43)
LYMPHOCYTES # BLD AUTO: 1.4 X10(3)/MCL (ref 0.6–4.6)
LYMPHOCYTES NFR BLD AUTO: 18.4 %
MCH RBC QN AUTO: 23.8 PG (ref 27–31)
MCHC RBC AUTO-ENTMCNC: 30.6 MG/DL (ref 33–36)
MCV RBC AUTO: 77.5 FL (ref 80–94)
MONOCYTES # BLD AUTO: 0.63 X10(3)/MCL (ref 0.1–1.3)
MONOCYTES NFR BLD AUTO: 8.3 %
NEUTROPHILS # BLD AUTO: 5.2 X10(3)/MCL (ref 2.1–9.2)
NEUTROPHILS NFR BLD AUTO: 67.7 %
PLATELET # BLD AUTO: 316 X10(3)/MCL (ref 130–400)
PMV BLD AUTO: 9.5 FL (ref 9.4–12.4)
POTASSIUM SERPL-SCNC: 3.7 MMOL/L (ref 3.5–5.1)
PROT SERPL-MCNC: 6.4 GM/DL (ref 5.8–7.6)
RBC # BLD AUTO: 5.43 X10(6)/MCL (ref 4.2–5.4)
SARS-COV-2 RNA RESP QL NAA+PROBE: NOT DETECTED
SODIUM SERPL-SCNC: 137 MMOL/L (ref 136–145)
TROPONIN I SERPL-MCNC: <0.01 NG/ML (ref 0–0.04)
WBC # SPEC AUTO: 7.6 X10(3)/MCL (ref 4.5–11.5)

## 2022-05-09 PROCEDURE — 99285 EMERGENCY DEPT VISIT HI MDM: CPT | Mod: 25

## 2022-05-09 PROCEDURE — 84484 ASSAY OF TROPONIN QUANT: CPT | Performed by: NURSE PRACTITIONER

## 2022-05-09 PROCEDURE — 80053 COMPREHEN METABOLIC PANEL: CPT | Performed by: NURSE PRACTITIONER

## 2022-05-09 PROCEDURE — 36415 COLL VENOUS BLD VENIPUNCTURE: CPT | Performed by: NURSE PRACTITIONER

## 2022-05-09 PROCEDURE — 85025 COMPLETE CBC W/AUTO DIFF WBC: CPT | Performed by: NURSE PRACTITIONER

## 2022-05-09 PROCEDURE — 93005 ELECTROCARDIOGRAM TRACING: CPT

## 2022-05-09 PROCEDURE — 87636 SARSCOV2 & INF A&B AMP PRB: CPT | Performed by: INTERNAL MEDICINE

## 2022-05-09 RX ORDER — LEVOTHYROXINE SODIUM 112 UG/1
112 TABLET ORAL DAILY
COMMUNITY
Start: 2022-04-07

## 2022-05-09 RX ORDER — FUROSEMIDE 40 MG/1
40 TABLET ORAL
COMMUNITY

## 2022-05-09 RX ORDER — SUCRALFATE 1 G/1
1 TABLET ORAL
COMMUNITY

## 2022-05-09 RX ORDER — ATORVASTATIN CALCIUM 80 MG/1
80 TABLET, FILM COATED ORAL
COMMUNITY

## 2022-05-09 RX ORDER — ISOSORBIDE MONONITRATE 30 MG/1
30 TABLET, EXTENDED RELEASE ORAL DAILY
COMMUNITY
Start: 2022-03-28

## 2022-05-09 RX ORDER — LANSOPRAZOLE 30 MG/1
30 CAPSULE, DELAYED RELEASE ORAL DAILY
COMMUNITY
Start: 2022-03-07

## 2022-05-09 RX ORDER — NITROGLYCERIN 0.4 MG/1
0.4 TABLET SUBLINGUAL
COMMUNITY
Start: 2021-07-23

## 2022-05-09 RX ORDER — ESTRADIOL 0.05 MG/D
1 PATCH, EXTENDED RELEASE TRANSDERMAL
COMMUNITY
Start: 2021-12-28

## 2022-05-09 RX ORDER — RANOLAZINE 1000 MG/1
1000 TABLET, EXTENDED RELEASE ORAL 2 TIMES DAILY
COMMUNITY
Start: 2021-12-31

## 2022-05-09 RX ORDER — MONTELUKAST SODIUM 10 MG/1
10 TABLET ORAL NIGHTLY
COMMUNITY
Start: 2022-01-28

## 2022-05-09 RX ORDER — PREDNISONE 20 MG/1
20 TABLET ORAL DAILY
COMMUNITY
Start: 2022-04-26

## 2022-05-09 RX ORDER — ALPRAZOLAM 0.5 MG/1
0.5 TABLET ORAL 3 TIMES DAILY
COMMUNITY
Start: 2022-03-28

## 2022-05-09 RX ORDER — ALBUTEROL SULFATE 0.83 MG/ML
2.5 SOLUTION RESPIRATORY (INHALATION) EVERY 6 HOURS PRN
COMMUNITY
Start: 2022-04-09

## 2022-05-09 RX ORDER — NEBIVOLOL 10 MG/1
10 TABLET ORAL DAILY
COMMUNITY
Start: 2022-04-26

## 2022-05-09 RX ORDER — IPRATROPIUM BROMIDE AND ALBUTEROL SULFATE 2.5; .5 MG/3ML; MG/3ML
3 SOLUTION RESPIRATORY (INHALATION) EVERY 6 HOURS PRN
Qty: 75 ML | Refills: 0 | Status: SHIPPED | OUTPATIENT
Start: 2022-05-09 | End: 2023-05-09

## 2022-05-09 RX ORDER — ESCITALOPRAM OXALATE 20 MG/1
20 TABLET ORAL DAILY
COMMUNITY
Start: 2022-04-26

## 2022-05-09 RX ORDER — TRAZODONE HYDROCHLORIDE 100 MG/1
200 TABLET ORAL NIGHTLY
COMMUNITY
Start: 2022-04-07

## 2022-05-09 RX ORDER — CYCLOBENZAPRINE HCL 10 MG
10 TABLET ORAL 3 TIMES DAILY PRN
COMMUNITY

## 2022-05-10 NOTE — ED PROVIDER NOTES
Source of History:  Patient     Chief complaint:  Cough (Reports been having problems with upper respiratory issues since December. States has been here a few times and her PCP and prescribed different regimes of medication but nothing helps)      HPI:  Lana Martel is a 61 y.o. female with hx of asthma presents to the ED for chest tightness and sob. She states she has had several workups and nothing is working. Recently saw pcp who gave her a new daily laba but is not helping. She reports she was told by PCP office to come to the ER for eval. Patient is not sob at this time. Vs stable. Denies fever or chills.     This is the extent to the patients complaints today here in the emergency department.    ROS: As per HPI and below:  General: No fever.  No chills.  Eyes: No visual changes.  ENT: No sore throat. No ear pain  Head: No headache.    Chest: No shortness of breath. No cough.  Cardiovascular: No chest pain.  Abdomen: No abdominal pain.  No nausea or vomiting.  Genito-Urinary: No abnormal urination.  Neurologic: No focal weakness.  No numbness.  MSK: No myalgias. No arthralgias.   Integument: No rashes or lesions.  Psych: No confusion      Review of patient's allergies indicates:   Allergen Reactions    Penicillins Shortness Of Breath    Biaxin [clarithromycin]     Celebrex [celecoxib] Hives    Compazine [prochlorperazine]     Sulfa (sulfonamide antibiotics) Hives    Toprol xl [metoprolol succinate]     Hydrochlorothiazide Rash    Percopap Anxiety       PMH:  As per HPI and below:  Past Medical History:   Diagnosis Date    Asthma     Coronary artery disease     Depression     GERD (gastroesophageal reflux disease)     Hypertension     Thyroid disease     TIA (transient ischemic attack)      Past Surgical History:   Procedure Laterality Date    APPENDECTOMY      CARDIAC SURGERY      CHOLECYSTECTOMY      HYSTERECTOMY      KNEE ARTHROSCOPY W/ ACL RECONSTRUCTION      TONSILLECTOMY    "      Social History     Tobacco Use    Smoking status: Never Smoker    Smokeless tobacco: Never Used       Physical Exam:    BP (!) 146/73   Pulse 80   Temp 99 °F (37.2 °C) (Oral)   Resp 16   Ht 5' 2" (1.575 m)   Wt 102.5 kg (226 lb)   SpO2 (!) 94%   BMI 41.34 kg/m²   Nursing note and vital signs reviewed.  Appearance: Afebrile. Not toxic appearing. No acute distress.  Head: Atraumatic  Eyes: No conjunctival injection. No scleral icterus  ENT: Normal phonation  Chest/ Respiratory: No respiratory distress. No accessory muscle use. Slightly diminished air movement on auscultation.   Cardiovascular: Regular rate   Abdomen:  Not distended.    Musculoskeletal: Good range of motion all joints.  No deformities.  Neck supple.  No meningismus.  Skin: No rashes seen.  Good turgor.  No ecchymoses.  Neurologic: GCS 15. Ambulates with a steady gait.   Mental Status:  Alert and oriented x 3.  Appropriate, conversant    Labs that have been ordered have been independently reviewed and interpreted by myself.        Initial Impression/ Differential Dx:  Asthma exacerbation; copd;     MDM:    61 y.o. female with sob since December presents to the ED for evaluation. She reports she was told by pcp office staff to come to the ED since she was having no improvement with new meds for dyspnea on exertion. Patient is not sob at this time. Denies cp. Will start workup and attempt to call pcp once results have returned.     ED Course as of 05/09/22 2218   Mon May 09, 2022   2210 Attempted to call the patients PCP sent she was told by his office to come to the ED with no answer. Patient is agitated due to the registration clerks and her insurance.  [SL]   2212 Discussed contacting the on call MD to the patient which she refused. She is already on steroids. Will start on duo nebs with instructions to follow up with PCP.  [SL]      ED Course User Index  [SL] HUA Green                 Diagnostic Impression:    1. Dyspnea, " unspecified type    2. Shortness of breath         ED Disposition Condition    Discharge Stable          ED Prescriptions     Medication Sig Dispense Start Date End Date Auth. Provider    albuterol-ipratropium (DUO-NEB) 2.5 mg-0.5 mg/3 mL nebulizer solution Take 3 mLs by nebulization every 6 (six) hours as needed for Wheezing. Rescue 75 mL 5/9/2022 5/9/2023 HUA Green        Follow-up Information     Follow up With Specialties Details Why Contact Info    Carlos A Zamora MD Internal Medicine   [address] [place] [address] [facility] [place], [facility] [address]  [phone]             HUA Green  05/09/22 2209

## 2022-05-10 NOTE — ED NOTES
Pt does not appear dyspneic. Breathing appears even and unlabored. Pt reports intermittent, chronic cough and shortness of breath which her PCP instructed her to come to the ED for.

## 2022-05-10 NOTE — DISCHARGE INSTRUCTIONS
Take medicines as prescribed    See your family doctor in one to 2 days for further evaluation, workup, and treatment as necessary    Avoid driving or operating machinery while taking medicines as some medicines might cause drowsiness and may cause problems. Also pain medicines have potential of being addictive  so use Pain meds specially Narcotics Sparingly.    The exam and treatment you received in Emergency Room was for an urgent problem and NOT INTENDED AS COMPLETE CARE. It is important that you FOLLOW UP with a doctor for ongoing care. If your symptoms become WORSE or you DO NOT IMPROVE and you are unable to reach your health care provider, you should RETURN to the emergency department. The Emergency Room doctor has provided a PRELIMINARY INTERPRETATION of all your STUDIES. A final interpretation may be done after you are discharged. IF A CHANGE in your diagnosis or treatment is needed WE WILL CONTACT YOU. It is critical that we have a CURRENT PHONE NUMBER FOR YOU.

## 2023-03-26 ENCOUNTER — HOSPITAL ENCOUNTER (EMERGENCY)
Facility: HOSPITAL | Age: 63
Discharge: HOME | End: 2023-03-27
Attending: INTERNAL MEDICINE
Payer: COMMERCIAL

## 2023-03-26 DIAGNOSIS — I10 HYPERTENSION: Primary | ICD-10-CM

## 2023-03-26 DIAGNOSIS — R51.9 HEADACHE, UNSPECIFIED HEADACHE TYPE: ICD-10-CM

## 2023-03-26 LAB
ALBUMIN SERPL-MCNC: 3.3 G/DL (ref 3.4–4.8)
ALBUMIN/GLOB SERPL: 1.1 RATIO (ref 1.1–2)
ALP SERPL-CCNC: 136 UNIT/L (ref 40–150)
ALT SERPL-CCNC: 11 UNIT/L (ref 0–55)
AST SERPL-CCNC: 9 UNIT/L (ref 5–34)
BASOPHILS # BLD AUTO: 0.02 X10(3)/MCL (ref 0–0.2)
BASOPHILS NFR BLD AUTO: 0.2 %
BILIRUBIN DIRECT+TOT PNL SERPL-MCNC: 0.3 MG/DL
BNP BLD-MCNC: 74.3 PG/ML
BUN SERPL-MCNC: 11 MG/DL (ref 9.8–20.1)
CALCIUM SERPL-MCNC: 9.1 MG/DL (ref 8.4–10.2)
CHLORIDE SERPL-SCNC: 102 MMOL/L (ref 98–107)
CO2 SERPL-SCNC: 26 MMOL/L (ref 23–31)
CREAT SERPL-MCNC: 0.87 MG/DL (ref 0.55–1.02)
EOSINOPHIL # BLD AUTO: 0.39 X10(3)/MCL (ref 0–0.9)
EOSINOPHIL NFR BLD AUTO: 4.3 %
ERYTHROCYTE [DISTWIDTH] IN BLOOD BY AUTOMATED COUNT: 15.6 % (ref 11.5–17)
GFR SERPLBLD CREATININE-BSD FMLA CKD-EPI: >60 MLS/MIN/1.73/M2
GLOBULIN SER-MCNC: 3.1 GM/DL (ref 2.4–3.5)
GLUCOSE SERPL-MCNC: 106 MG/DL (ref 82–115)
HCT VFR BLD AUTO: 40.8 % (ref 37–47)
HGB BLD-MCNC: 12.8 G/DL (ref 12–16)
IMM GRANULOCYTES # BLD AUTO: 0.02 X10(3)/MCL (ref 0–0.04)
IMM GRANULOCYTES NFR BLD AUTO: 0.2 %
LYMPHOCYTES # BLD AUTO: 3.21 X10(3)/MCL (ref 0.6–4.6)
LYMPHOCYTES NFR BLD AUTO: 35.2 %
MCH RBC QN AUTO: 24.8 PG (ref 27–31)
MCHC RBC AUTO-ENTMCNC: 31.4 G/DL (ref 33–36)
MCV RBC AUTO: 78.9 FL (ref 80–94)
MONOCYTES # BLD AUTO: 0.6 X10(3)/MCL (ref 0.1–1.3)
MONOCYTES NFR BLD AUTO: 6.6 %
NEUTROPHILS # BLD AUTO: 4.88 X10(3)/MCL (ref 2.1–9.2)
NEUTROPHILS NFR BLD AUTO: 53.5 %
PLATELET # BLD AUTO: 297 X10(3)/MCL (ref 130–400)
PMV BLD AUTO: 9.8 FL (ref 7.4–10.4)
POTASSIUM SERPL-SCNC: 3.8 MMOL/L (ref 3.5–5.1)
PROT SERPL-MCNC: 6.4 GM/DL (ref 5.8–7.6)
RBC # BLD AUTO: 5.17 X10(6)/MCL (ref 4.2–5.4)
SODIUM SERPL-SCNC: 136 MMOL/L (ref 136–145)
TROPONIN I SERPL-MCNC: <0.01 NG/ML (ref 0–0.04)
WBC # SPEC AUTO: 9.1 X10(3)/MCL (ref 4.5–11.5)

## 2023-03-26 PROCEDURE — 99285 EMERGENCY DEPT VISIT HI MDM: CPT | Mod: 25

## 2023-03-26 PROCEDURE — 85025 COMPLETE CBC W/AUTO DIFF WBC: CPT | Performed by: NURSE PRACTITIONER

## 2023-03-26 PROCEDURE — 93005 ELECTROCARDIOGRAM TRACING: CPT

## 2023-03-26 PROCEDURE — 80053 COMPREHEN METABOLIC PANEL: CPT | Performed by: NURSE PRACTITIONER

## 2023-03-26 PROCEDURE — 93010 ELECTROCARDIOGRAM REPORT: CPT | Mod: ,,, | Performed by: INTERNAL MEDICINE

## 2023-03-26 PROCEDURE — 84484 ASSAY OF TROPONIN QUANT: CPT | Performed by: NURSE PRACTITIONER

## 2023-03-26 PROCEDURE — 93010 EKG 12-LEAD: ICD-10-PCS | Mod: ,,, | Performed by: INTERNAL MEDICINE

## 2023-03-26 PROCEDURE — 83880 ASSAY OF NATRIURETIC PEPTIDE: CPT | Performed by: NURSE PRACTITIONER

## 2023-03-27 VITALS
WEIGHT: 219 LBS | HEIGHT: 65 IN | RESPIRATION RATE: 18 BRPM | HEART RATE: 67 BPM | BODY MASS INDEX: 36.49 KG/M2 | SYSTOLIC BLOOD PRESSURE: 158 MMHG | TEMPERATURE: 99 F | DIASTOLIC BLOOD PRESSURE: 98 MMHG | OXYGEN SATURATION: 98 %

## 2023-03-27 NOTE — ED PROVIDER NOTES
Encounter Date: 3/26/2023       History     Chief Complaint   Patient presents with    Hypertension     Patient took her PRN clonidine around 5 and still had a pressure 185/116 at home. Patient states she has been having constant headaches as well     Patient is a 62-year-old female with significant history of asthma, CAD, depression, GERD, hypertension, thyroid disease, and previous TIA who presents to the emergency department [facility] for evaluation and treatment of hypertension, symptomatic with headache, her medications were reviewed in detail and it appears that she is prescribed Olmesartan and clonidine at this time.  She states that she has been taking her medications as prescribed she remains symptomatic we hypertensive.  She denies any chest pain, denies any shortness of breath, denies any nausea or vomiting, denies any other associated complaints or conditions.  She last took her clonidine dose around 5:00 p.m. this afternoon.  Blood pressure was 193/78 with a pulse of 76 in triage.  It appears that she was prescribed Bystolic at some point in time she has not filled it in months, I am uncertain whether was discontinued at the request of her physician or if she is not filling this medicine and she should be taking it.    Review of patient's allergies indicates:   Allergen Reactions    Penicillins Shortness Of Breath    Biaxin [clarithromycin]     Celebrex [celecoxib] Hives    Compazine [prochlorperazine]     Sulfa (sulfonamide antibiotics) Hives    Toprol xl [metoprolol succinate]     Hydrochlorothiazide Rash    Percopap Anxiety     Past Medical History:   Diagnosis Date    Asthma     Coronary artery disease     Depression     GERD (gastroesophageal reflux disease)     Hypertension     Thyroid disease     TIA (transient ischemic attack)      Past Surgical History:   Procedure Laterality Date    APPENDECTOMY      CARDIAC SURGERY      CHOLECYSTECTOMY      HYSTERECTOMY      KNEE ARTHROSCOPY W/ ACL  RECONSTRUCTION      TONSILLECTOMY       History reviewed. No pertinent family history.  Social History     Tobacco Use    Smoking status: Never    Smokeless tobacco: Never     Review of Systems   All other systems reviewed and are negative.    Physical Exam     Initial Vitals [03/26/23 2015]   BP Pulse Resp Temp SpO2   (!) 193/78 76 18 98.5 °F (36.9 °C) 98 %      MAP       --         Physical Exam    Nursing note and vitals reviewed.  Constitutional: She appears well-developed and well-nourished.   HENT:   Head: Normocephalic and atraumatic.   Eyes: Conjunctivae are normal. Pupils are equal, round, and reactive to light.   Neck: Neck supple.   Normal range of motion.  Cardiovascular:  Normal rate, regular rhythm and normal heart sounds.           Pulmonary/Chest: Breath sounds normal.   Abdominal: Abdomen is soft.   Musculoskeletal:         General: Normal range of motion.      Cervical back: Normal range of motion and neck supple.     Neurological: She is alert and oriented to person, place, and time. She has normal strength. GCS score is 15. GCS eye subscore is 4. GCS verbal subscore is 5. GCS motor subscore is 6.   Skin: Skin is warm and dry.   Psychiatric: She has a normal mood and affect. Her behavior is normal. Judgment and thought content normal.       ED Course   Procedures  Labs Reviewed   COMPREHENSIVE METABOLIC PANEL - Abnormal; Notable for the following components:       Result Value    Albumin Level 3.3 (*)     All other components within normal limits   CBC WITH DIFFERENTIAL - Abnormal; Notable for the following components:    MCV 78.9 (*)     MCH 24.8 (*)     MCHC 31.4 (*)     All other components within normal limits   TROPONIN I - Normal   B-TYPE NATRIURETIC PEPTIDE - Normal   CBC W/ AUTO DIFFERENTIAL    Narrative:     The following orders were created for panel order CBC auto differential.  Procedure                               Abnormality         Status                     ---------                                -----------         ------                     CBC with Differential[[id number]]        Abnormal            Final result                 Please view results for these tests on the individual orders.     EKG Readings: (Independently Interpreted)   Initial Reading: No STEMI. Rhythm: Normal Sinus Rhythm. Heart Rate: 68. Ectopy: No Ectopy. Conduction: Normal.   Normal sinus rhythm  Cannot rule out anterior infarct, age undetermined  Abnormal EC  No change from previous EKG   ECG Results              EKG 12-lead (Final result)  Result time 03/26/23 23:03:39      Final result by Interface, Lab In [facility] (03/26/23 23:03:39)                   Narrative:    Test Reason : I10,    Vent. Rate : 068 BPM     Atrial Rate : 068 BPM     P-R Int : 158 ms          QRS Dur : 076 ms      QT Int : 428 ms       P-R-T Axes : 042 019 038 degrees     QTc Int : 455 ms    Normal sinus rhythm  Cannot rule out Anterior infarct ,age undetermined  Abnormal ECG  When compared with ECG of 09-MAY-2022 20:38,  ST no longer depressed in Inferior leads  Confirmed by Ricardo Bailey MD ([id number]) on 3/26/2023 11:03:21 PM    Referred By: [email or web address]   SELF           Confirmed By:Ricardo Bailey MD                                  Imaging Results              X-Ray Chest PA And Lateral (Final result)  Result time 03/27/23 09:01:51      Final result by Brian Bond MD (03/27/23 09:01:51)                   Impression:      1. Borderline cardiomegaly  2. Atherosclerosis  3. Thoracic spondylosis      Electronically signed by: Brian Bond  Date:    03/27/2023  Time:    09:01               Narrative:    EXAMINATION:  XR CHEST PA AND LATERAL    CLINICAL HISTORY:  , Essential (primary) hypertension.    COMPARISON:  05/09/2022    FINDINGS:  PA/AP and lateral views reveal the heart to be borderline enlarged.  The trachea to the right of midline.  Atherosclerosis is seen within the aorta.                                    Labs Reviewed   COMPREHENSIVE  METABOLIC PANEL - Abnormal; Notable for the following components:       Result Value    Albumin Level 3.3 (*)     All other components within normal limits   CBC WITH DIFFERENTIAL - Abnormal; Notable for the following components:    MCV 78.9 (*)     MCH 24.8 (*)     MCHC 31.4 (*)     All other components within normal limits   TROPONIN I - Normal   B-TYPE NATRIURETIC PEPTIDE - Normal   CBC W/ AUTO DIFFERENTIAL    Narrative:     The following orders were created for panel order CBC auto differential.  Procedure                               Abnormality         Status                     ---------                               -----------         ------                     CBC with Differential[[id number]]        Abnormal            Final result                 Please view results for these tests on the individual orders.     Medical Decision Making  62 year old female who presents with symptomatic hypertension.    DDX:  htn, ami, anxiety, headache    Amount and/or Complexity of Data Reviewed  External Data Reviewed: labs and ECG.     Details: No change in CKG from previous  Labs: ordered. Decision-making details documented in ED Course.  Radiology: ordered. Decision-making details documented in ED Course.  ECG/medicine tests: ordered. Decision-making details documented in ED Course.  Discussion of management or test interpretation with external provider(s): Headache resolved when her blood pressure was lowered, no acute findings in her workup, she will follow up with her PCP this week.          Medications - No data to display              ED Course as of 04/02/23 1603   Sun Mar 26, 2023   2337 Physical examination unremarkable, the patient was symptomatic and hypertensive upon arrival upon pressure recheck her pressure was 169/100, her headache resolved, her lab work is essentially unremarkable, troponins negative, BNP is 74, she does not have a white count, chest x-ray no pulmonary edema, no infiltrates, I discussed  the case with Dr. Marshall and we agree that we should not attempt to lower pressure anymore in the emergency room setting, reassurance was given to the patient with instructions to continue her blood pressure medication as prescribed and follow-up with her PCP/cardiologist for hypertension management.  She will be discharged at this time. [EB]      ED Course User Index  [EB] HUA Sanchez                 Clinical Impression:   Final diagnoses:  [I10] Hypertension (Primary)  [R51.9] Headache, unspecified headache type        ED Disposition Condition    Discharge Stable          ED Prescriptions    None       Follow-up Information       Follow up With Specialties Details Why Contact Info    Carlos A Zamora MD Internal Medicine In 2 days  [facility] [facility] [address]  [employer] [employer], [facility] [address] [phone]      Dr. Malachi Noonan  In 2 days               HUA Sanchez  04/02/23 7310

## 2023-05-03 ENCOUNTER — HOSPITAL ENCOUNTER (EMERGENCY)
Facility: HOSPITAL | Age: 63
Discharge: HOME | End: 2023-05-03
Attending: EMERGENCY MEDICINE
Payer: COMMERCIAL

## 2023-05-03 VITALS
SYSTOLIC BLOOD PRESSURE: 154 MMHG | TEMPERATURE: 98 F | DIASTOLIC BLOOD PRESSURE: 82 MMHG | BODY MASS INDEX: 29.37 KG/M2 | HEIGHT: 64 IN | HEART RATE: 82 BPM | OXYGEN SATURATION: 97 % | WEIGHT: 172 LBS | RESPIRATION RATE: 18 BRPM

## 2023-05-03 DIAGNOSIS — S20.211A CHEST WALL CONTUSION, RIGHT, INITIAL ENCOUNTER: ICD-10-CM
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
## 2023-05-03 PROCEDURE — 25000003 PHARM REV CODE 250: Performed by: EMERGENCY MEDICINE

## 2023-05-03 PROCEDURE — 99283 EMERGENCY DEPT VISIT LOW MDM: CPT | Mod: 25

## 2023-05-03 RX ORDER — HYDROCODONE BITARTRATE AND ACETAMINOPHEN 5; 325 MG/1; MG/1
1 TABLET ORAL EVERY 6 HOURS PRN
Qty: 12 TABLET | Refills: 0 | Status: SHIPPED | OUTPATIENT
Start: 2023-05-03

## 2023-05-03 RX ORDER — ACETAMINOPHEN 325 MG/1
650 TABLET ORAL
Status: COMPLETED | OUTPATIENT
Start: 2023-05-03 | End: 2023-05-03

## 2023-05-03 RX ADMIN — ACETAMINOPHEN 650 MG: 325 TABLET ORAL at 10:05

## 2023-05-03 NOTE — Clinical Note
"Lana"Don Martel was seen and treated in our emergency department on 5/3/2023.  She may return to work on 05/08/2023.       If you have any questions or concerns, please don't hesitate to call.      Brian Hahn MD"
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
## 2023-05-03 NOTE — DISCHARGE INSTRUCTIONS
You will be very sore   Use your common sense common sense   things will be worse in the next 24-72 hours then should start improving  Continue any prior medications  Make sure you drink lots of water to help absorb these bruises    Use 1 of the muscle relaxant you have if needed be careful not to mix too much acetaminophen plain Tylenol with the prescription each prescription pill has 325 mg of Tylenol

## 2023-10-18 ENCOUNTER — LAB VISIT (OUTPATIENT)
Dept: LAB | Facility: HOSPITAL | Age: 63
End: 2023-10-18
Attending: INTERNAL MEDICINE
Payer: COMMERCIAL

## 2023-10-18 DIAGNOSIS — R50.9 FEVER, UNSPECIFIED FEVER CAUSE: Primary | ICD-10-CM

## 2023-10-18 DIAGNOSIS — R09.81 NASAL CONGESTION: ICD-10-CM

## 2023-10-18 DIAGNOSIS — R05.9 COUGH, UNSPECIFIED TYPE: ICD-10-CM

## 2023-10-18 LAB
FLUAV AG UPPER RESP QL IA.RAPID: NOT DETECTED
FLUBV AG UPPER RESP QL IA.RAPID: NOT DETECTED
SARS-COV-2 RNA RESP QL NAA+PROBE: NOT DETECTED

## 2023-10-18 PROCEDURE — 0240U COVID/FLU A&B PCR: CPT

## 2024-01-02 ENCOUNTER — HOSPITAL ENCOUNTER (EMERGENCY)
Facility: HOSPITAL | Age: 64
Discharge: HOME | End: 2024-01-02
Attending: INTERNAL MEDICINE
Payer: COMMERCIAL

## 2024-01-02 VITALS
WEIGHT: 235 LBS | BODY MASS INDEX: 40.34 KG/M2 | DIASTOLIC BLOOD PRESSURE: 82 MMHG | TEMPERATURE: 97 F | RESPIRATION RATE: 23 BRPM | OXYGEN SATURATION: 96 % | HEART RATE: 69 BPM | SYSTOLIC BLOOD PRESSURE: 142 MMHG

## 2024-01-02 DIAGNOSIS — R07.89 ATYPICAL CHEST PAIN: Primary | ICD-10-CM

## 2024-01-02 DIAGNOSIS — R07.9 CHEST PAIN: ICD-10-CM

## 2024-01-02 LAB
ALBUMIN SERPL-MCNC: 3.6 G/DL (ref 3.4–4.8)
ALBUMIN/GLOB SERPL: 1.2 RATIO (ref 1.1–2)
ALP SERPL-CCNC: 95 UNIT/L (ref 40–150)
ALT SERPL-CCNC: 28 UNIT/L (ref 0–55)
AST SERPL-CCNC: 14 UNIT/L (ref 5–34)
BASOPHILS # BLD AUTO: 0.04 X10(3)/MCL
BASOPHILS NFR BLD AUTO: 0.3 %
BILIRUB SERPL-MCNC: 0.3 MG/DL
BUN SERPL-MCNC: 15 MG/DL (ref 9.8–20.1)
CALCIUM SERPL-MCNC: 9.2 MG/DL (ref 8.4–10.2)
CHLORIDE SERPL-SCNC: 104 MMOL/L (ref 98–107)
CO2 SERPL-SCNC: 23 MMOL/L (ref 23–31)
CREAT SERPL-MCNC: 1.19 MG/DL (ref 0.55–1.02)
EOSINOPHIL # BLD AUTO: 0.01 X10(3)/MCL (ref 0–0.9)
EOSINOPHIL NFR BLD AUTO: 0.1 %
ERYTHROCYTE [DISTWIDTH] IN BLOOD BY AUTOMATED COUNT: 14 % (ref 11.5–17)
GFR SERPLBLD CREATININE-BSD FMLA CKD-EPI: 51 MLS/MIN/1.73/M2
GLOBULIN SER-MCNC: 3.1 GM/DL (ref 2.4–3.5)
GLUCOSE SERPL-MCNC: 168 MG/DL (ref 82–115)
HCT VFR BLD AUTO: 44.3 % (ref 37–47)
HGB BLD-MCNC: 14.1 G/DL (ref 12–16)
IMM GRANULOCYTES # BLD AUTO: 0.09 X10(3)/MCL (ref 0–0.04)
IMM GRANULOCYTES NFR BLD AUTO: 0.6 %
LYMPHOCYTES # BLD AUTO: 2.17 X10(3)/MCL (ref 0.6–4.6)
LYMPHOCYTES NFR BLD AUTO: 14.7 %
MCH RBC QN AUTO: 26.9 PG (ref 27–31)
MCHC RBC AUTO-ENTMCNC: 31.8 G/DL (ref 33–36)
MCV RBC AUTO: 84.4 FL (ref 80–94)
MONOCYTES # BLD AUTO: 0.3 X10(3)/MCL (ref 0.1–1.3)
MONOCYTES NFR BLD AUTO: 2 %
NEUTROPHILS # BLD AUTO: 12.13 X10(3)/MCL (ref 2.1–9.2)
NEUTROPHILS NFR BLD AUTO: 82.3 %
PLATELET # BLD AUTO: 308 X10(3)/MCL (ref 130–400)
PMV BLD AUTO: 10.3 FL (ref 7.4–10.4)
POTASSIUM SERPL-SCNC: 4.4 MMOL/L (ref 3.5–5.1)
PROT SERPL-MCNC: 6.7 GM/DL (ref 5.8–7.6)
RBC # BLD AUTO: 5.25 X10(6)/MCL (ref 4.2–5.4)
SODIUM SERPL-SCNC: 138 MMOL/L (ref 136–145)
TROPONIN I SERPL-MCNC: <0.01 NG/ML (ref 0–0.04)
WBC # SPEC AUTO: 14.74 X10(3)/MCL (ref 4.5–11.5)

## 2024-01-02 PROCEDURE — 93005 ELECTROCARDIOGRAM TRACING: CPT

## 2024-01-02 PROCEDURE — 80053 COMPREHEN METABOLIC PANEL: CPT | Performed by: INTERNAL MEDICINE

## 2024-01-02 PROCEDURE — 99285 EMERGENCY DEPT VISIT HI MDM: CPT | Mod: 25

## 2024-01-02 PROCEDURE — 93010 ELECTROCARDIOGRAM REPORT: CPT | Mod: ,,, | Performed by: INTERNAL MEDICINE

## 2024-01-02 PROCEDURE — 85025 COMPLETE CBC W/AUTO DIFF WBC: CPT | Performed by: INTERNAL MEDICINE

## 2024-01-02 PROCEDURE — 84484 ASSAY OF TROPONIN QUANT: CPT | Performed by: INTERNAL MEDICINE

## 2024-01-02 RX ORDER — TRAMADOL HYDROCHLORIDE 50 MG/1
50 TABLET ORAL EVERY 6 HOURS PRN
Qty: 12 TABLET | Refills: 0 | Status: SHIPPED | OUTPATIENT
Start: 2024-01-02

## 2024-01-02 RX ORDER — ACETAMINOPHEN 500 MG
1000 TABLET ORAL
Status: DISCONTINUED | OUTPATIENT
Start: 2024-01-02 | End: 2024-01-02 | Stop reason: HOSPADM

## 2024-01-02 NOTE — ED PROVIDER NOTES
01/02/2024         2:38 PM    Source of History:  History obtained from the patient.     Chief complaint:  From Nurse Triage:  Chest Pain (Pt c/o left sided chest pain radiating to left shoulder x 3 hours .)    HISTORY OF PRESENT ILLNES:  Lana Martel is a 63 y.o. female  has a past medical history of Asthma, Coronary artery disease, Depression, GERD (gastroesophageal reflux disease), Hypertension, Thyroid disease, and TIA (transient ischemic attack). presenting with Chest Pain (Pt c/o left sided chest pain radiating to left shoulder x 3 hours .)  Patient also complains of some mild shortness of breath and cough.      REVIEW OF SYSTEMS:   Constitutional symptoms:     Skin symptoms:      Eye symptoms:     ENMT symptoms:      Respiratory symptoms:      Cardiovascular symptoms:     Gastrointestinal symptoms:      Genitourinary symptoms:     Musculoskeletal symptoms:      Neurologic symptoms:      Psychiatric symptoms:               Additional review of systems information: Patient Denies Any Other Complaints.    All Other Systems Reviewed With Patient And Negative.    ALLEGIES:  Review of patient's allergies indicates:   Allergen Reactions    Penicillins Shortness Of Breath    Biaxin [clarithromycin]     Celebrex [celecoxib] Hives    Compazine [prochlorperazine]     Sulfa (sulfonamide antibiotics) Hives    Toprol xl [metoprolol succinate]     Hydrochlorothiazide Rash    Percopap Anxiety       MEDICINE LIST:  Current Outpatient Medications   Medication Instructions    albuterol (PROVENTIL) 2.5 mg, Nebulization, Every 6 hours PRN    albuterol-ipratropium (DUO-NEB) 2.5 mg-0.5 mg/3 mL nebulizer solution 3 mLs, Nebulization, Every 6 hours PRN, Rescue    ALPRAZolam (XANAX) 0.5 mg, Oral, 3 times daily    atorvastatin (LIPITOR) 80 mg, Oral    cyclobenzaprine (FLEXERIL) 10 mg, Oral, 3 times daily PRN    dextromethorphan-guaiFENesin  mg (MUCINEX DM)  mg per 12 hr tablet 1 tablet, Oral, Daily    EScitalopram  oxalate (LEXAPRO) 20 mg, Oral, Daily    furosemide (LASIX) 40 mg, Oral    HYDROcodone-acetaminophen (NORCO) 5-325 mg per tablet 1 tablet, Oral, Every 6 hours PRN    isosorbide mononitrate (IMDUR) 30 mg, Oral, Daily    lansoprazole (PREVACID) 30 mg, Oral, Daily    levothyroxine (SYNTHROID) 112 mcg, Oral, Daily    LYLLANA 0.05 mg/24 hr 1 patch, Twice weekly    montelukast (SINGULAIR) 10 mg, Oral, Nightly    nebivoloL (BYSTOLIC) 10 mg, Oral, Daily    nitroGLYCERIN (NITROSTAT) 0.4 mg, Sublingual    predniSONE (DELTASONE) 20 mg, Oral, Daily    ranolazine (RANEXA) 1,000 mg, Oral, 2 times daily    sucralfate (CARAFATE) 1 g, Oral    traMADoL (ULTRAM) 50 mg, Oral, Every 6 hours PRN    traZODone (DESYREL) 200 mg, Oral, Nightly        PMH:  As per HPI and below:    Reviewed and updated in chart.    PAST MEDICAL HISTORY:  Past Medical History:   Diagnosis Date    Asthma     Coronary artery disease     Depression     GERD (gastroesophageal reflux disease)     Hypertension     Thyroid disease     TIA (transient ischemic attack)         PAST SURGICAL HISTORY:  Past Surgical History:   Procedure Laterality Date    APPENDECTOMY      CARDIAC SURGERY      CHOLECYSTECTOMY      HYSTERECTOMY      KNEE ARTHROSCOPY W/ ACL RECONSTRUCTION      TONSILLECTOMY         SOCIAL HISTORY:  Social History     Tobacco Use    Smoking status: Never    Smokeless tobacco: Never       FAMILY HISTORY:  No family history on file.     PROBLEM LIST:  Patient Active Problem List   Diagnosis    Hypertension    Headache, unspecified headache type        PHYSICAL EXAM:      ED Triage Vitals [01/02/24 1429]   BP (!) 147/77   Pulse 88   Resp 18   Temp 97 °F (36.1 °C)   SpO2 98 %        Vital Signs: Reviewed As In Chart.  General:  Alert, No Cardiorespiratory Distress Noted.   Eye:  Pupils equal and reactive to light and accomodation. Extraocular Movements Are Intact.   ENT: Mucus membranes are moist.   Cardiovascular:  Regular Rate And Rhythm     Respiratory:  Clear  to auscultation bilaterally    Gastrointestinal:  Soft, Non Distended, Non Tenderness, Normal Bowel Sounds.    Neurological:  Alert And Oriented To Person, Place, Time, And Situation, Normal Motor Observed, Normal Speech Observed.  Musculoskeletal:  No Gross Deformity Noted.     Psychiatric:  Cooperative.      ED WORKUP FOR MEDICAL DECISION MAKING:    ED ORDERS:  Orders Placed This Encounter   Procedures    X-Ray Chest 1 View    CBC auto differential    Comprehensive metabolic panel    Troponin I    CBC with Differential    EKG 12-lead    Insert Saline lock IV       ED MEDICINES:  Medications   acetaminophen tablet 1,000 mg (1,000 mg Oral Incomplete 1/2/24 1040)                ED LABS ORDERED AND REVIEWED:  Admission on 01/02/2024   Component Date Value Ref Range Status    Sodium Level 01/02/2024 138  136 - 145 mmol/L Final    Potassium Level 01/02/2024 4.4  3.5 - 5.1 mmol/L Final    Chloride 01/02/2024 104  98 - 107 mmol/L Final    Carbon Dioxide 01/02/2024 23  23 - 31 mmol/L Final    Glucose Level 01/02/2024 168 (H)  82 - 115 mg/dL Final    Blood Urea Nitrogen 01/02/2024 15.0  9.8 - 20.1 mg/dL Final    Creatinine 01/02/2024 1.19 (H)  0.55 - 1.02 mg/dL Final    Calcium Level Total 01/02/2024 9.2  8.4 - 10.2 mg/dL Final    Protein Total 01/02/2024 6.7  5.8 - 7.6 gm/dL Final    Albumin Level 01/02/2024 3.6  3.4 - 4.8 g/dL Final    Globulin 01/02/2024 3.1  2.4 - 3.5 gm/dL Final    Albumin/Globulin Ratio 01/02/2024 1.2  1.1 - 2.0 ratio Final    Bilirubin Total 01/02/2024 0.3  <=1.5 mg/dL Final    Alkaline Phosphatase 01/02/2024 95  40 - 150 unit/L Final    Alanine Aminotransferase 01/02/2024 28  0 - 55 unit/L Final    Aspartate Aminotransferase 01/02/2024 14  5 - 34 unit/L Final    eGFR 01/02/2024 51  mls/min/1.73/m2 Final    Troponin-I 01/02/2024 <0.010  0.000 - 0.045 ng/mL Final    WBC 01/02/2024 14.74 (H)  4.50 - 11.50 x10(3)/mcL Final    RBC 01/02/2024 5.25  4.20 - 5.40 x10(6)/mcL Final    Hgb 01/02/2024 14.1  12.0  - 16.0 g/dL Final    Hct 01/02/2024 44.3  37.0 - 47.0 % Final    MCV 01/02/2024 84.4  80.0 - 94.0 fL Final    MCH 01/02/2024 26.9 (L)  27.0 - 31.0 pg Final    MCHC 01/02/2024 31.8 (L)  33.0 - 36.0 g/dL Final    RDW 01/02/2024 14.0  11.5 - 17.0 % Final    Platelet 01/02/2024 308  130 - 400 x10(3)/mcL Final    MPV 01/02/2024 10.3  7.4 - 10.4 fL Final    Neut % 01/02/2024 82.3  % Final    Lymph % 01/02/2024 14.7  % Final    Mono % 01/02/2024 2.0  % Final    Eos % 01/02/2024 0.1  % Final    Basophil % 01/02/2024 0.3  % Final    Lymph # 01/02/2024 2.17  0.6 - 4.6 x10(3)/mcL Final    Neut # 01/02/2024 12.13 (H)  2.1 - 9.2 x10(3)/mcL Final    Mono # 01/02/2024 0.30  0.1 - 1.3 x10(3)/mcL Final    Eos # 01/02/2024 0.01  0 - 0.9 x10(3)/mcL Final    Baso # 01/02/2024 0.04  <=0.2 x10(3)/mcL Final    IG# 01/02/2024 0.09 (H)  0 - 0.04 x10(3)/mcL Final    IG% 01/02/2024 0.6  % Final       RADIOLOGY STUDIES ORDERED AND REVIEWED:  Imaging Results              X-Ray Chest 1 View (Final result)  Result time 01/02/24 15:28:47      Final result by Brian Bond MD (01/02/24 15:28:47)                   Impression:      1. Mild cardiomegaly  2. Atherosclerosis  3. Thoracic spondylosis      Electronically signed by: Brian Bond  Date:    01/02/2024  Time:    15:28               Narrative:    EXAMINATION:  XR CHEST 1 VIEW    CLINICAL HISTORY:  , Chest pain, unspecified.    COMPARISON:  05/03/2023    FINDINGS:  An AP view or more reveals the heart to be mildly enlarged.  The trachea is to the right of midline.  No infiltrate or effusion is seen.  Atherosclerosis is seen within the aorta.  Bony structures are osteopenic.                                      MEDICAL DECISION MAKING:    Lana Martel is 63 y.o. female who  has a past medical history of Asthma, Coronary artery disease, Depression, GERD (gastroesophageal reflux disease), Hypertension, Thyroid disease, and TIA (transient ischemic attack). arrives in ER with c/o Chest  Pain (Pt c/o left sided chest pain radiating to left shoulder x 3 hours .)      Reviewed Nurses Note. Reviewed Vital Signs.     Reviewed Pertinent old records, History and updated as necessary.    Vitals:    01/02/24 1445   BP:    Pulse: 82   Resp:    Temp:         Medical Decision Making  Differential diagnosis includes but is not limited to atypical chest pain, acute MI, pulmonary embolus, thoracic aneurysm, chest wall pain.    Amount and/or Complexity of Data Reviewed  Labs: ordered.     Details: Elevated WBC  Radiology: ordered and independent interpretation performed.     Details: No acute findings  ECG/medicine tests: ordered and independent interpretation performed.     Details: ECG done at 2:20 p.m. shows a normal sinus rhythm rate of 77 no ST-T changes, normal intervals, no ectopy.    Risk  OTC drugs.                        PROCEDURES PERFORMED IN ED:  Procedures    DIAGNOSTIC IMPRESSION:        ICD-10-CM ICD-9-CM   1. Atypical chest pain  R07.89 786.59   2. Chest pain  R07.9 786.50         ED Disposition Condition    Discharge Stable               Medication List        START taking these medications      traMADoL 50 mg tablet  Commonly known as: ULTRAM  Take 1 tablet (50 mg total) by mouth every 6 (six) hours as needed for Pain.            ASK your doctor about these medications      albuterol 2.5 mg /3 mL (0.083 %) nebulizer solution  Commonly known as: PROVENTIL     albuterol-ipratropium 2.5 mg-0.5 mg/3 mL nebulizer solution  Commonly known as: DUO-NEB  Take 3 mLs by nebulization every 6 (six) hours as needed for Wheezing. Rescue     ALPRAZolam 0.5 MG tablet  Commonly known as: XANAX     atorvastatin 80 MG tablet  Commonly known as: LIPITOR     cyclobenzaprine 10 MG tablet  Commonly known as: FLEXERIL     dextromethorphan-guaiFENesin  mg  mg per 12 hr tablet  Commonly known as: MUCINEX DM     EScitalopram oxalate 20 MG tablet  Commonly known as: LEXAPRO     furosemide 40 MG tablet  Commonly  known as: LASIX     HYDROcodone-acetaminophen 5-325 mg per tablet  Commonly known as: NORCO  Take 1 tablet by mouth every 6 (six) hours as needed for Pain.     isosorbide mononitrate 30 MG 24 hr tablet  Commonly known as: IMDUR     lansoprazole 30 MG capsule  Commonly known as: PREVACID     levothyroxine 112 MCG tablet  Commonly known as: SYNTHROID     LYLLANA 0.05 mg/24 hr  Generic drug: estradiol 0.05 mg/24 hr td ptsw     montelukast 10 mg tablet  Commonly known as: SINGULAIR     nebivoloL 10 MG Tab  Commonly known as: BYSTOLIC     nitroGLYCERIN 0.4 MG SL tablet  Commonly known as: NITROSTAT     predniSONE 20 MG tablet  Commonly known as: DELTASONE     ranolazine 1,000 mg Tb12  Commonly known as: RANEXA     sucralfate 1 gram tablet  Commonly known as: CARAFATE     traZODone 100 MG tablet  Commonly known as: DESYREL               Where to Get Your Medications        These medications were sent to [id number] - [place], [place] - [address] AT [place] [address] & [place] [address] [facility] [place] [address]      Phone: [phone]   traMADoL 50 mg tablet           Follow-up Information       Carlos A Zamora MD In 1 day.    Specialty: Internal Medicine  Contact information:  [address] [place] [address]  [facility]  [facility] [address]  [phone]                              ED Prescriptions       Medication Sig Dispense Start Date End Date Auth. Provider    traMADoL (ULTRAM) 50 mg tablet Take 1 tablet (50 mg total) by mouth every 6 (six) hours as needed for Pain. 12 tablet 1/2/2024 -- El Sen MD          Follow-up Information       Follow up With Specialties Details Why Contact Info    Carlos A Zamora MD Internal Medicine In 1 day  [address] [place] [address]  [facility]  [facility] [address]  [phone]                El Sen MD  01/02/24 6875

## 2024-08-30 ENCOUNTER — HOSPITAL ENCOUNTER (EMERGENCY)
Facility: HOSPITAL | Age: 64
Discharge: ANOTHER HOSPITAL | End: 2024-08-31
Attending: EMERGENCY MEDICINE
Payer: MEDICARE

## 2024-08-30 DIAGNOSIS — R42 VERTIGO: Primary | ICD-10-CM

## 2024-08-30 DIAGNOSIS — R42 DIZZINESS: ICD-10-CM

## 2024-08-30 DIAGNOSIS — F41.9 ANXIETY: ICD-10-CM

## 2024-08-30 DIAGNOSIS — J45.21 MILD INTERMITTENT ASTHMA WITH ACUTE EXACERBATION: ICD-10-CM

## 2024-08-30 LAB
ALBUMIN SERPL-MCNC: 3.4 G/DL (ref 3.4–4.8)
ALBUMIN/GLOB SERPL: 1 RATIO (ref 1.1–2)
ALP SERPL-CCNC: 145 UNIT/L (ref 40–150)
ALT SERPL-CCNC: 20 UNIT/L (ref 0–55)
ANION GAP SERPL CALC-SCNC: 7 MEQ/L
AST SERPL-CCNC: 14 UNIT/L (ref 5–34)
BASOPHILS # BLD AUTO: 0.08 X10(3)/MCL
BASOPHILS NFR BLD AUTO: 0.9 %
BILIRUB SERPL-MCNC: 0.3 MG/DL
BILIRUB UR QL STRIP.AUTO: NEGATIVE
BNP BLD-MCNC: 72.4 PG/ML
BUN SERPL-MCNC: 8 MG/DL (ref 9.8–20.1)
CALCIUM SERPL-MCNC: 9.3 MG/DL (ref 8.4–10.2)
CHLORIDE SERPL-SCNC: 105 MMOL/L (ref 98–107)
CLARITY UR: CLEAR
CO2 SERPL-SCNC: 24 MMOL/L (ref 23–31)
COLOR UR AUTO: YELLOW
CREAT SERPL-MCNC: 1 MG/DL (ref 0.55–1.02)
CREAT/UREA NIT SERPL: 8
EOSINOPHIL # BLD AUTO: 0.17 X10(3)/MCL (ref 0–0.9)
EOSINOPHIL NFR BLD AUTO: 1.8 %
ERYTHROCYTE [DISTWIDTH] IN BLOOD BY AUTOMATED COUNT: 14.9 % (ref 11.5–17)
EST. AVERAGE GLUCOSE BLD GHB EST-MCNC: 131.2 MG/DL
GFR SERPLBLD CREATININE-BSD FMLA CKD-EPI: >60 ML/MIN/1.73/M2
GLOBULIN SER-MCNC: 3.4 GM/DL (ref 2.4–3.5)
GLUCOSE SERPL-MCNC: 93 MG/DL (ref 82–115)
GLUCOSE UR QL STRIP: NEGATIVE
HBA1C MFR BLD: 6.2 %
HCT VFR BLD AUTO: 42.5 % (ref 37–47)
HGB BLD-MCNC: 13.7 G/DL (ref 12–16)
HGB UR QL STRIP: NEGATIVE
IMM GRANULOCYTES # BLD AUTO: 0.03 X10(3)/MCL (ref 0–0.04)
IMM GRANULOCYTES NFR BLD AUTO: 0.3 %
KETONES UR QL STRIP: NEGATIVE
LEUKOCYTE ESTERASE UR QL STRIP: NEGATIVE
LYMPHOCYTES # BLD AUTO: 3.65 X10(3)/MCL (ref 0.6–4.6)
LYMPHOCYTES NFR BLD AUTO: 39.5 %
MCH RBC QN AUTO: 25.1 PG (ref 27–31)
MCHC RBC AUTO-ENTMCNC: 32.2 G/DL (ref 33–36)
MCV RBC AUTO: 78 FL (ref 80–94)
MONOCYTES # BLD AUTO: 0.66 X10(3)/MCL (ref 0.1–1.3)
MONOCYTES NFR BLD AUTO: 7.2 %
NEUTROPHILS # BLD AUTO: 4.64 X10(3)/MCL (ref 2.1–9.2)
NEUTROPHILS NFR BLD AUTO: 50.3 %
NITRITE UR QL STRIP: NEGATIVE
OHS QRS DURATION: 74 MS
OHS QTC CALCULATION: 423 MS
PH UR STRIP: 5.5 [PH]
PLATELET # BLD AUTO: 322 X10(3)/MCL (ref 130–400)
PMV BLD AUTO: 10 FL (ref 7.4–10.4)
POCT GLUCOSE: 84 MG/DL (ref 70–110)
POTASSIUM SERPL-SCNC: 4 MMOL/L (ref 3.5–5.1)
PROT SERPL-MCNC: 6.8 GM/DL (ref 5.8–7.6)
PROT UR QL STRIP: NEGATIVE
RBC # BLD AUTO: 5.45 X10(6)/MCL (ref 4.2–5.4)
SODIUM SERPL-SCNC: 136 MMOL/L (ref 136–145)
SP GR UR STRIP.AUTO: 1.01 (ref 1–1.03)
UROBILINOGEN UR STRIP-ACNC: 0.2
WBC # BLD AUTO: 9.23 X10(3)/MCL (ref 4.5–11.5)

## 2024-08-30 PROCEDURE — 25000003 PHARM REV CODE 250: Performed by: EMERGENCY MEDICINE

## 2024-08-30 PROCEDURE — 94640 AIRWAY INHALATION TREATMENT: CPT

## 2024-08-30 PROCEDURE — 96374 THER/PROPH/DIAG INJ IV PUSH: CPT

## 2024-08-30 PROCEDURE — 93005 ELECTROCARDIOGRAM TRACING: CPT

## 2024-08-30 PROCEDURE — 81003 URINALYSIS AUTO W/O SCOPE: CPT | Performed by: EMERGENCY MEDICINE

## 2024-08-30 PROCEDURE — 85025 COMPLETE CBC W/AUTO DIFF WBC: CPT | Performed by: EMERGENCY MEDICINE

## 2024-08-30 PROCEDURE — 25000242 PHARM REV CODE 250 ALT 637 W/ HCPCS: Performed by: EMERGENCY MEDICINE

## 2024-08-30 PROCEDURE — 63600175 PHARM REV CODE 636 W HCPCS: Performed by: EMERGENCY MEDICINE

## 2024-08-30 PROCEDURE — 93010 ELECTROCARDIOGRAM REPORT: CPT | Mod: ,,, | Performed by: INTERNAL MEDICINE

## 2024-08-30 PROCEDURE — 99285 EMERGENCY DEPT VISIT HI MDM: CPT | Mod: 25

## 2024-08-30 PROCEDURE — 83036 HEMOGLOBIN GLYCOSYLATED A1C: CPT | Performed by: EMERGENCY MEDICINE

## 2024-08-30 PROCEDURE — 83880 ASSAY OF NATRIURETIC PEPTIDE: CPT | Performed by: EMERGENCY MEDICINE

## 2024-08-30 PROCEDURE — 80053 COMPREHEN METABOLIC PANEL: CPT | Performed by: EMERGENCY MEDICINE

## 2024-08-30 PROCEDURE — 94761 N-INVAS EAR/PLS OXIMETRY MLT: CPT

## 2024-08-30 PROCEDURE — 82962 GLUCOSE BLOOD TEST: CPT

## 2024-08-30 RX ORDER — ACETAMINOPHEN 325 MG/1
650 TABLET ORAL
Status: COMPLETED | OUTPATIENT
Start: 2024-08-30 | End: 2024-08-30

## 2024-08-30 RX ORDER — SODIUM CHLORIDE 9 MG/ML
1000 INJECTION, SOLUTION INTRAVENOUS
Status: COMPLETED | OUTPATIENT
Start: 2024-08-30 | End: 2024-08-30

## 2024-08-30 RX ORDER — LORAZEPAM 2 MG/ML
1 INJECTION INTRAMUSCULAR
Status: COMPLETED | OUTPATIENT
Start: 2024-08-30 | End: 2024-08-30

## 2024-08-30 RX ORDER — MECLIZINE HCL 12.5 MG 12.5 MG/1
25 TABLET ORAL
Status: COMPLETED | OUTPATIENT
Start: 2024-08-30 | End: 2024-08-30

## 2024-08-30 RX ORDER — IPRATROPIUM BROMIDE AND ALBUTEROL SULFATE 2.5; .5 MG/3ML; MG/3ML
3 SOLUTION RESPIRATORY (INHALATION)
Status: COMPLETED | OUTPATIENT
Start: 2024-08-30 | End: 2024-08-30

## 2024-08-30 RX ADMIN — IPRATROPIUM BROMIDE AND ALBUTEROL SULFATE 3 ML: .5; 3 SOLUTION RESPIRATORY (INHALATION) at 11:08

## 2024-08-30 RX ADMIN — MECLIZINE HCL 12.5 MG 25 MG: 12.5 TABLET ORAL at 11:08

## 2024-08-30 RX ADMIN — LORAZEPAM 1 MG: 2 INJECTION INTRAMUSCULAR; INTRAVENOUS at 12:08

## 2024-08-30 RX ADMIN — SODIUM CHLORIDE 1000 ML: 9 INJECTION, SOLUTION INTRAVENOUS at 04:08

## 2024-08-30 RX ADMIN — ACETAMINOPHEN 325MG 650 MG: 325 TABLET ORAL at 02:08

## 2024-08-30 NOTE — ED PROVIDER NOTES
Encounter Date: 8/30/2024       History     Chief Complaint   Patient presents with    Shortness of Breath    Dizziness     C/o dizziness and more SOB than usual started this am, states it may be her anxiety     HPI  64-year-old female history of anxiety, vertigo on meclizine p.r.n., asthma, previous TIA, hypertension now with complaints of 2 hour history of dizziness, vertigo, mild shortness of breath and feeling anxious.  Patient denies associated headache, head injury, neck pain, fever, chills, photophobia, substernal chest pain, cough, abdominal pain, nausea vomiting or diarrhea or dysuria.  Patient recently had COVID-19 6 weeks ago.  Patient had original COVID vaccine but no booster, no influenza booster.  Patient denies any new motor or sensory loss, difficulty speaking, or comprehension.  Review of patient's allergies indicates:   Allergen Reactions    Penicillins Shortness Of Breath    Biaxin [clarithromycin]     Celebrex [celecoxib] Hives    Compazine [prochlorperazine]     Oxycodone-acetaminophen Hallucinations    Sulfa (sulfonamide antibiotics) Hives    Toprol xl [metoprolol succinate]     Hydrochlorothiazide Rash    Percopap Anxiety     Past Medical History:   Diagnosis Date    Asthma     Coronary artery disease     Depression     GERD (gastroesophageal reflux disease)     Hypertension     Thyroid disease     TIA (transient ischemic attack)      Past Surgical History:   Procedure Laterality Date    APPENDECTOMY      CARDIAC SURGERY      CHOLECYSTECTOMY      HYSTERECTOMY      KNEE ARTHROSCOPY W/ ACL RECONSTRUCTION      TONSILLECTOMY       No family history on file.  Social History     Tobacco Use    Smoking status: Never    Smokeless tobacco: Never     Review of Systems   All other systems reviewed and are negative.      Physical Exam     Initial Vitals [08/30/24 1025]   BP Pulse Resp Temp SpO2   (!) 146/74 81 20 98 °F (36.7 °C) 99 %      MAP       --         Physical Exam    Nursing note and vitals  reviewed.  Constitutional: She appears well-developed and well-nourished. She is cooperative.   HENT:   Head: Normocephalic and atraumatic.   Eyes: Conjunctivae and lids are normal.   Neck: Trachea normal. Neck supple.   Normal range of motion.  Cardiovascular:  Normal rate, regular rhythm, normal heart sounds and intact distal pulses.           Pulmonary/Chest: She has wheezes.   Abdominal: Abdomen is soft. Bowel sounds are normal. There is no abdominal tenderness.   Musculoskeletal:         General: Normal range of motion.      Cervical back: Normal range of motion and neck supple.     Neurological: She is alert and oriented to person, place, and time. She has normal strength. She displays normal reflexes. No cranial nerve deficit or sensory deficit. GCS score is 15. GCS eye subscore is 4. GCS verbal subscore is 5. GCS motor subscore is 6.   Skin: Skin is warm and dry. No rash noted.   Psychiatric: She has a normal mood and affect. Her speech is normal and behavior is normal. Judgment and thought content normal.         ED Course   Procedures  Labs Reviewed   COMPREHENSIVE METABOLIC PANEL - Abnormal       Result Value    Sodium 136      Potassium 4.0      Chloride 105      CO2 24      Glucose 93      Blood Urea Nitrogen 8.0 (*)     Creatinine 1.00      Calcium 9.3      Protein Total 6.8      Albumin 3.4      Globulin 3.4      Albumin/Globulin Ratio 1.0 (*)     Bilirubin Total 0.3            ALT 20      AST 14      eGFR >60      Anion Gap 7.0      BUN/Creatinine Ratio 8     CBC WITH DIFFERENTIAL - Abnormal    WBC 9.23      RBC 5.45 (*)     Hgb 13.7      Hct 42.5      MCV 78.0 (*)     MCH 25.1 (*)     MCHC 32.2 (*)     RDW 14.9      Platelet 322      MPV 10.0      Neut % 50.3      Lymph % 39.5      Mono % 7.2      Eos % 1.8      Basophil % 0.9      Lymph # 3.65      Neut # 4.64      Mono # 0.66      Eos # 0.17      Baso # 0.08      IG# 0.03      IG% 0.3     B-TYPE NATRIURETIC PEPTIDE - Normal    Natriuretic  Peptide 72.4     URINALYSIS - Normal    Color, UA Yellow      Appearance, UA Clear      Specific Gravity, UA 1.015      pH, UA 5.5      Protein, UA Negative      Glucose, UA Negative      Ketones, UA Negative      Blood, UA Negative      Bilirubin, UA Negative      Urobilinogen, UA 0.2      Nitrites, UA Negative      Leukocyte Esterase, UA Negative     CBC W/ AUTO DIFFERENTIAL    Narrative:     The following orders were created for panel order CBC auto differential.  Procedure                               Abnormality         Status                     ---------                               -----------         ------                     CBC with Differential[[id number]]        Abnormal            Final result                 Please view results for these tests on the individual orders.   HEMOGLOBIN A1C    Hemoglobin A1c 6.2      Estimated Average Glucose 131.2     POCT GLUCOSE    POCT Glucose 84       EKG Readings: (Independently Interpreted)   Normal sinus rhythm 83, no acute ST elevation or ST depressions, normal axis.     ECG Results              EKG 12-lead (Final result)        Collection Time Result Time QRS Duration OHS QTC Calculation    08/30/24 10:30:06 08/30/24 17:33:02 74 423                     Final result by Interface, Lab In [facility] (08/30/24 17:33:11)                   Narrative:    Test Reason : R42,    Vent. Rate : 083 BPM     Atrial Rate : 083 BPM     P-R Int : 150 ms          QRS Dur : 074 ms      QT Int : 360 ms       P-R-T Axes : 067 042 069 degrees     QTc Int : 423 ms    Normal sinus rhythm  Normal ECG  When compared with ECG of 02-JAN-2024 14:20,  No significant change was found  Confirmed by Eamon Sow MD ([id number]) on 8/30/2024 5:32:58 PM    Referred By:             Confirmed By:Eamon Sow MD                                  Imaging Results              CT Head Without Contrast (Final result)  Result time 08/30/24 12:25:02      Final result by Brian Bond MD (08/30/24 12:25:02)                    Impression:      1. No acute intracranial abnormality identified  2. Generalized cerebral and cerebellar atrophy  3. Findings and other details as above      Electronically signed by: Brian Bond  Date:    08/30/2024  Time:    12:25               Narrative:    EXAMINATION:  CT HEAD WITHOUT CONTRAST    CLINICAL HISTORY:  Transient ischemic attack (TIA);, .    TECHNIQUE:  PATIENT RADIATION DOSE: DLP(mGycm) 894    As per PQRS measures, all CT scans at this facility used dose modulation, iterative reconstruction, and/or weight based dose adjustment when appropriate to reduce radiation dose to as low as reasonably achievable.    COMPARISON:  02/20/2018    FINDINGS:  Serial axial images were obtained of the head without the administration of IV contrast. Both brain and bone parenchymal windows were obtained.  Additional coronal and sagittal reconstructions were obtained.  Ventricles, cisterns, and sulci are prominent in size.  There is no evidence of intracranial hemorrhage, midline shift, mass effect, or abnormal extra-axial fluid collections.  Mild scattered hypodensities are seen within the peripheral white matter suspicious for mild small vessel ischemic changes.  Intracranial atherosclerosis is identified.  Cerebellar tonsils extend caudally to the level of foramen magnum.  There is beam hardening artifact at the skull base.  There is mucosal thickening at the right maxillary sinus and to a lesser extent the left maxillary sinus.  Mastoid air cells are aerated bilaterally.  Filling defects are the external auditory canals bilaterally suggesting cerumen.  Clinical correlation is indicated.  Bony structures are osteopenic.                                       X-Ray Chest AP Portable (Final result)  Result time 08/30/24 12:05:48      Final result by Brian Bond MD (08/30/24 12:05:48)                   Impression:      1. Borderline cardiomegaly  2. Atherosclerosis  3. Thoracic  spondylosis      Electronically signed by: Brian Bond  Date:    08/30/2024  Time:    12:05               Narrative:    EXAMINATION:  XR CHEST AP PORTABLE    CLINICAL HISTORY:  , Dyspnea, unspecified.    COMPARISON:  01/02/2024    FINDINGS:  An AP view or more reveals the heart to be borderline enlarged.  The trachea is midline.  No definite infiltrate or effusion is seen.  Atherosclerosis is seen within the aorta and Degenerative changes are noted to the thoracic spine.  Bony structures are osteopenic.                                       Medications   meclizine tablet 25 mg (25 mg Oral Given 8/30/24 1118)   albuterol-ipratropium 2.5 mg-0.5 mg/3 mL nebulizer solution 3 mL (3 mLs Nebulization Given 8/30/24 1125)   LORazepam injection 1 mg (1 mg Intravenous Given 8/30/24 1250)   acetaminophen tablet 650 mg (650 mg Oral Given 8/30/24 1445)   0.9%  NaCl infusion (1,000 mLs Intravenous New Bag 8/30/24 1615)     Medical Decision Making  Amount and/or Complexity of Data Reviewed  Labs: ordered.  Radiology: ordered.    Risk  OTC drugs.  Prescription drug management.    64-year-old female history of anxiety, vertigo on meclizine p.r.n., asthma, previous TIA, hypertension now with complaints of 2 day history of dizziness, vertigo, mild shortness of breath and feeling anxious.  Vital signs were stable on arrival to ED and during ED course, afebrile, O2 sat 98% on room air.  Patient with nonfocal neuro exam.  Patient did have some mild wheezing on exam.  Labs essentially WNL.  Chest x-ray /head CT  with no acute findings.  UA pending.  Received normal saline here in ED 1 L as well as Antivert 25 mg p.o., DuoNeb 1 hand-held nebulizer for her wheezing, and Ativan 1 mg IV for her anxiety.  Patient is still complaining of vertiginous symptoms, intermittent vision changes, and according to her sister she is slightly confused at times although when I converse with the patient at the bedside she does appear to be alert and  oriented and continues to have a nonfocal neuro exam..  Discussed case with the MD on-call Dr. Anaya for her PCP Dr. Zamora who feels patient should have an MRI prior to admission at this facility.  MRI is not available so we will transfer patient for high-level care for MRI and further workup of her intractable vertigo and other neurologic symptoms to rule out possibility of CVA as the cause of her current symptoms.                                  Clinical Impression:  Final diagnoses:  [R42] Dizziness  [R42] Vertigo (Primary)  [F41.9] Anxiety  [J45.21] Mild intermittent asthma with acute exacerbation          ED Disposition Condition    Transfer to Another Facility Stable                Von Mora MD  08/31/24 8003

## 2024-08-31 ENCOUNTER — HOSPITAL ENCOUNTER (INPATIENT)
Facility: HOSPITAL | Age: 64
LOS: 1 days | Discharge: HOME | DRG: 149 | End: 2024-08-31
Attending: STUDENT IN AN ORGANIZED HEALTH CARE EDUCATION/TRAINING PROGRAM | Admitting: INTERNAL MEDICINE
Payer: MEDICARE

## 2024-08-31 VITALS
WEIGHT: 227.5 LBS | OXYGEN SATURATION: 97 % | SYSTOLIC BLOOD PRESSURE: 144 MMHG | HEART RATE: 78 BPM | TEMPERATURE: 99 F | BODY MASS INDEX: 41.86 KG/M2 | DIASTOLIC BLOOD PRESSURE: 75 MMHG | RESPIRATION RATE: 18 BRPM | HEIGHT: 62 IN

## 2024-08-31 VITALS
SYSTOLIC BLOOD PRESSURE: 155 MMHG | OXYGEN SATURATION: 99 % | HEIGHT: 62 IN | TEMPERATURE: 98 F | WEIGHT: 232 LBS | RESPIRATION RATE: 19 BRPM | HEART RATE: 76 BPM | BODY MASS INDEX: 42.69 KG/M2 | DIASTOLIC BLOOD PRESSURE: 95 MMHG

## 2024-08-31 DIAGNOSIS — R42 VERTIGO: Primary | ICD-10-CM

## 2024-08-31 DIAGNOSIS — G45.9 TIA DUE TO EMBOLISM: ICD-10-CM

## 2024-08-31 DIAGNOSIS — R42 DIZZINESS: ICD-10-CM

## 2024-08-31 DIAGNOSIS — I74.9 TIA DUE TO EMBOLISM: ICD-10-CM

## 2024-08-31 LAB
APICAL FOUR CHAMBER EJECTION FRACTION: 69 %
APICAL TWO CHAMBER EJECTION FRACTION: 65 %
APTT PPP: 29.7 SECONDS (ref 23.2–33.7)
AV INDEX (PROSTH): 0.82
AV MEAN GRADIENT: 7 MMHG
AV PEAK GRADIENT: 12 MMHG
AV REGURGITATION PRESSURE HALF TIME: 367 MS
AV VALVE AREA BY VELOCITY RATIO: 2.37 CM²
AV VALVE AREA: 2.57 CM²
AV VELOCITY RATIO: 0.75
BSA FOR ECHO PROCEDURE: 2.12 M2
CHOLEST SERPL-MCNC: 126 MG/DL
CHOLEST/HDLC SERPL: 3 {RATIO} (ref 0–5)
CK MB SERPL-MCNC: 3 NG/ML
CV ECHO LV RWT: 0.5 CM
DOP CALC AO PEAK VEL: 1.75 M/S
DOP CALC AO VTI: 34.9 CM
DOP CALC LVOT AREA: 3.1 CM2
DOP CALC LVOT DIAMETER: 2 CM
DOP CALC LVOT PEAK VEL: 1.32 M/S
DOP CALC LVOT STROKE VOLUME: 89.8 CM3
DOP CALC MV VTI: 41.5 CM
DOP CALCLVOT PEAK VEL VTI: 28.6 CM
E WAVE DECELERATION TIME: 193 MSEC
E/A RATIO: 0.51
E/E' RATIO: 8.24 M/S
ECHO LV POSTERIOR WALL: 1.2 CM (ref 0.6–1.1)
FRACTIONAL SHORTENING: 27 % (ref 28–44)
HDLC SERPL-MCNC: 40 MG/DL (ref 35–60)
HR MV ECHO: 79 BPM
INR PPP: 1.1
INTERVENTRICULAR SEPTUM: 1.1 CM (ref 0.6–1.1)
LDLC SERPL CALC-MCNC: 58 MG/DL (ref 50–140)
LEFT ATRIUM AREA SYSTOLIC (APICAL 2 CHAMBER): 19.6 CM2
LEFT ATRIUM AREA SYSTOLIC (APICAL 4 CHAMBER): 15.3 CM2
LEFT ATRIUM SIZE: 3.8 CM
LEFT ATRIUM VOLUME INDEX MOD: 23.5 ML/M2
LEFT ATRIUM VOLUME MOD: 47.4 CM3
LEFT INTERNAL DIMENSION IN SYSTOLE: 3.5 CM (ref 2.1–4)
LEFT VENTRICLE DIASTOLIC VOLUME INDEX: 53.47 ML/M2
LEFT VENTRICLE DIASTOLIC VOLUME: 108 ML
LEFT VENTRICLE END DIASTOLIC VOLUME APICAL 2 CHAMBER: 38 ML
LEFT VENTRICLE END DIASTOLIC VOLUME APICAL 4 CHAMBER: 51.3 ML
LEFT VENTRICLE END SYSTOLIC VOLUME APICAL 2 CHAMBER: 53.7 ML
LEFT VENTRICLE END SYSTOLIC VOLUME APICAL 4 CHAMBER: 37.4 ML
LEFT VENTRICLE MASS INDEX: 102 G/M2
LEFT VENTRICLE SYSTOLIC VOLUME INDEX: 25.2 ML/M2
LEFT VENTRICLE SYSTOLIC VOLUME: 50.9 ML
LEFT VENTRICULAR INTERNAL DIMENSION IN DIASTOLE: 4.8 CM (ref 3.5–6)
LEFT VENTRICULAR MASS: 206.37 G
LV LATERAL E/E' RATIO: 7.78 M/S
LV SEPTAL E/E' RATIO: 8.75 M/S
LVED V (TEICH): 108 ML
LVES V (TEICH): 50.9 ML
LVOT MG: 4 MMHG
LVOT MV: 0.91 CM/S
MV MEAN GRADIENT: 4 MMHG
MV PEAK A VEL: 1.38 M/S
MV PEAK E VEL: 0.7 M/S
MV PEAK GRADIENT: 7 MMHG
MV STENOSIS PRESSURE HALF TIME: 117 MS
MV VALVE AREA BY CONTINUITY EQUATION: 2.16 CM2
MV VALVE AREA P 1/2 METHOD: 1.88 CM2
OHS LV EJECTION FRACTION SIMPSONS BIPLANE MOD: 67 %
PISA AR MAX VEL: 4.33 M/S
PISA TR MAX VEL: 1 M/S
PROTHROMBIN TIME: 13.5 SECONDS (ref 12.5–14.5)
PV PEAK GRADIENT: 4 MMHG
PV PEAK VELOCITY: 1.01 M/S
RA PRESSURE ESTIMATED: 3 MMHG
RV TB RVSP: 4 MMHG
TDI LATERAL: 0.09 M/S
TDI SEPTAL: 0.08 M/S
TDI: 0.09 M/S
TR MAX PG: 4 MMHG
TRICUSPID ANNULAR PLANE SYSTOLIC EXCURSION: 2.44 CM
TRIGL SERPL-MCNC: 142 MG/DL (ref 37–140)
TROPONIN I SERPL-MCNC: 0.02 NG/ML (ref 0–0.04)
TSH SERPL-ACNC: 1.8 UIU/ML (ref 0.35–4.94)
TV REST PULMONARY ARTERY PRESSURE: 7 MMHG
VLDLC SERPL CALC-MCNC: 28 MG/DL
Z-SCORE OF LEFT VENTRICULAR DIMENSION IN END DIASTOLE: -2.15
Z-SCORE OF LEFT VENTRICULAR DIMENSION IN END SYSTOLE: -0.33

## 2024-08-31 PROCEDURE — 25000003 PHARM REV CODE 250: Performed by: INTERNAL MEDICINE

## 2024-08-31 PROCEDURE — 99222 1ST HOSP IP/OBS MODERATE 55: CPT | Mod: ,,, | Performed by: SPECIALIST

## 2024-08-31 PROCEDURE — 85730 THROMBOPLASTIN TIME PARTIAL: CPT | Performed by: INTERNAL MEDICINE

## 2024-08-31 PROCEDURE — 82553 CREATINE MB FRACTION: CPT | Performed by: INTERNAL MEDICINE

## 2024-08-31 PROCEDURE — 84484 ASSAY OF TROPONIN QUANT: CPT | Performed by: INTERNAL MEDICINE

## 2024-08-31 PROCEDURE — 85610 PROTHROMBIN TIME: CPT | Performed by: INTERNAL MEDICINE

## 2024-08-31 PROCEDURE — 11000001 HC ACUTE MED/SURG PRIVATE ROOM

## 2024-08-31 PROCEDURE — 84443 ASSAY THYROID STIM HORMONE: CPT | Performed by: INTERNAL MEDICINE

## 2024-08-31 PROCEDURE — 25000003 PHARM REV CODE 250: Performed by: STUDENT IN AN ORGANIZED HEALTH CARE EDUCATION/TRAINING PROGRAM

## 2024-08-31 PROCEDURE — 36415 COLL VENOUS BLD VENIPUNCTURE: CPT | Performed by: INTERNAL MEDICINE

## 2024-08-31 PROCEDURE — 92523 SPEECH SOUND LANG COMPREHEN: CPT

## 2024-08-31 PROCEDURE — 25000242 PHARM REV CODE 250 ALT 637 W/ HCPCS: Performed by: INTERNAL MEDICINE

## 2024-08-31 PROCEDURE — 80061 LIPID PANEL: CPT | Performed by: INTERNAL MEDICINE

## 2024-08-31 RX ORDER — LEVOTHYROXINE SODIUM 112 UG/1
112 TABLET ORAL
Status: DISCONTINUED | OUTPATIENT
Start: 2024-08-31 | End: 2024-08-31 | Stop reason: HOSPADM

## 2024-08-31 RX ORDER — MECLIZINE HCL 25MG 25 MG/1
25 TABLET, CHEWABLE ORAL 3 TIMES DAILY PRN
COMMUNITY

## 2024-08-31 RX ORDER — LABETALOL HYDROCHLORIDE 5 MG/ML
10 INJECTION, SOLUTION INTRAVENOUS
Status: DISCONTINUED | OUTPATIENT
Start: 2024-08-31 | End: 2024-08-31 | Stop reason: HOSPADM

## 2024-08-31 RX ORDER — SEMAGLUTIDE 0.68 MG/ML
0.25 INJECTION, SOLUTION SUBCUTANEOUS
COMMUNITY
Start: 2024-08-25

## 2024-08-31 RX ORDER — FLUTICASONE PROPIONATE AND SALMETEROL 250; 50 UG/1; UG/1
1 POWDER RESPIRATORY (INHALATION) 2 TIMES DAILY
COMMUNITY
Start: 2024-08-09

## 2024-08-31 RX ORDER — ALBUTEROL SULFATE 0.83 MG/ML
2.5 SOLUTION RESPIRATORY (INHALATION) EVERY 6 HOURS PRN
Status: DISCONTINUED | OUTPATIENT
Start: 2024-08-31 | End: 2024-08-31 | Stop reason: HOSPADM

## 2024-08-31 RX ORDER — MONTELUKAST SODIUM 10 MG/1
10 TABLET ORAL NIGHTLY
Status: DISCONTINUED | OUTPATIENT
Start: 2024-08-31 | End: 2024-08-31 | Stop reason: HOSPADM

## 2024-08-31 RX ORDER — TRAZODONE HYDROCHLORIDE 100 MG/1
200 TABLET ORAL NIGHTLY
Status: DISCONTINUED | OUTPATIENT
Start: 2024-08-31 | End: 2024-08-31 | Stop reason: HOSPADM

## 2024-08-31 RX ORDER — EZETIMIBE 10 MG/1
10 TABLET ORAL DAILY
COMMUNITY
Start: 2024-08-25

## 2024-08-31 RX ORDER — EZETIMIBE 10 MG/1
10 TABLET ORAL DAILY
Status: DISCONTINUED | OUTPATIENT
Start: 2024-08-31 | End: 2024-08-31 | Stop reason: HOSPADM

## 2024-08-31 RX ORDER — ESCITALOPRAM OXALATE 10 MG/1
20 TABLET ORAL DAILY
Status: DISCONTINUED | OUTPATIENT
Start: 2024-08-31 | End: 2024-08-31 | Stop reason: HOSPADM

## 2024-08-31 RX ORDER — CYCLOBENZAPRINE HCL 10 MG
10 TABLET ORAL 3 TIMES DAILY PRN
Status: DISCONTINUED | OUTPATIENT
Start: 2024-08-31 | End: 2024-08-31 | Stop reason: HOSPADM

## 2024-08-31 RX ORDER — ALPRAZOLAM 0.5 MG/1
0.5 TABLET ORAL 3 TIMES DAILY
Status: DISCONTINUED | OUTPATIENT
Start: 2024-08-31 | End: 2024-08-31 | Stop reason: HOSPADM

## 2024-08-31 RX ORDER — SODIUM CHLORIDE 0.9 % (FLUSH) 0.9 %
10 SYRINGE (ML) INJECTION
Status: DISCONTINUED | OUTPATIENT
Start: 2024-08-31 | End: 2024-08-31 | Stop reason: HOSPADM

## 2024-08-31 RX ORDER — FLUTICASONE FUROATE AND VILANTEROL 100; 25 UG/1; UG/1
1 POWDER RESPIRATORY (INHALATION) DAILY
Status: DISCONTINUED | OUTPATIENT
Start: 2024-08-31 | End: 2024-08-31 | Stop reason: HOSPADM

## 2024-08-31 RX ORDER — POTASSIUM CHLORIDE 750 MG/1
10 CAPSULE, EXTENDED RELEASE ORAL 2 TIMES DAILY
COMMUNITY
Start: 2024-07-01

## 2024-08-31 RX ORDER — NAPROXEN SODIUM 220 MG/1
81 TABLET, FILM COATED ORAL DAILY
Qty: 360 TABLET | Refills: 0 | Status: SHIPPED | OUTPATIENT
Start: 2024-08-31 | End: 2025-08-31

## 2024-08-31 RX ORDER — NAPROXEN SODIUM 220 MG/1
81 TABLET, FILM COATED ORAL DAILY
Status: DISCONTINUED | OUTPATIENT
Start: 2024-08-31 | End: 2024-08-31 | Stop reason: HOSPADM

## 2024-08-31 RX ORDER — ISOSORBIDE MONONITRATE 30 MG/1
30 TABLET, EXTENDED RELEASE ORAL DAILY
Status: DISCONTINUED | OUTPATIENT
Start: 2024-08-31 | End: 2024-08-31 | Stop reason: HOSPADM

## 2024-08-31 RX ORDER — LORAZEPAM 1 MG/1
1 TABLET ORAL ONCE AS NEEDED
Status: DISCONTINUED | OUTPATIENT
Start: 2024-08-31 | End: 2024-08-31 | Stop reason: HOSPADM

## 2024-08-31 RX ORDER — ATORVASTATIN CALCIUM 40 MG/1
80 TABLET, FILM COATED ORAL NIGHTLY
Status: DISCONTINUED | OUTPATIENT
Start: 2024-08-31 | End: 2024-08-31 | Stop reason: HOSPADM

## 2024-08-31 RX ORDER — SUCRALFATE 1 G/1
1 TABLET ORAL
Status: DISCONTINUED | OUTPATIENT
Start: 2024-08-31 | End: 2024-08-31 | Stop reason: HOSPADM

## 2024-08-31 RX ORDER — BISACODYL 10 MG/1
10 SUPPOSITORY RECTAL DAILY PRN
Status: DISCONTINUED | OUTPATIENT
Start: 2024-08-31 | End: 2024-08-31 | Stop reason: HOSPADM

## 2024-08-31 RX ORDER — LEVOTHYROXINE SODIUM 112 UG/1
112 TABLET ORAL DAILY
Status: DISCONTINUED | OUTPATIENT
Start: 2024-08-31 | End: 2024-08-31

## 2024-08-31 RX ORDER — CLONIDINE HYDROCHLORIDE 0.1 MG/1
0.1 TABLET ORAL 3 TIMES DAILY PRN
COMMUNITY
Start: 2024-05-08

## 2024-08-31 RX ORDER — ERGOCALCIFEROL 1.25 MG/1
50000 CAPSULE ORAL
COMMUNITY
Start: 2024-07-01

## 2024-08-31 RX ADMIN — SUCRALFATE 1 G: 1 TABLET ORAL at 12:08

## 2024-08-31 RX ADMIN — ISOSORBIDE MONONITRATE 30 MG: 30 TABLET, EXTENDED RELEASE ORAL at 12:08

## 2024-08-31 RX ADMIN — MONTELUKAST 10 MG: 10 TABLET, FILM COATED ORAL at 05:08

## 2024-08-31 RX ADMIN — LEVOTHYROXINE SODIUM 112 MCG: 112 TABLET ORAL at 05:08

## 2024-08-31 RX ADMIN — SUCRALFATE 1 G: 1 TABLET ORAL at 07:08

## 2024-08-31 RX ADMIN — EZETIMIBE 10 MG: 10 TABLET ORAL at 09:08

## 2024-08-31 RX ADMIN — ASPIRIN 81 MG CHEWABLE TABLET 81 MG: 81 TABLET CHEWABLE at 12:08

## 2024-08-31 RX ADMIN — FLUTICASONE FUROATE AND VILANTEROL TRIFENATATE 1 PUFF: 100; 25 POWDER RESPIRATORY (INHALATION) at 09:08

## 2024-08-31 RX ADMIN — ESCITALOPRAM OXALATE 20 MG: 10 TABLET ORAL at 09:08

## 2024-08-31 NOTE — NURSING
Nurses Note -- 4 Eyes      8/31/2024   7:26 AM      Skin assessed during: Q Shift Change      [x] No Altered Skin Integrity Present    []Prevention Measures Documented      [] Yes- Altered Skin Integrity Present or Discovered   [] LDA Added if Not in Epic (Describe Wound)   [] New Altered Skin Integrity was Present on Admit and Documented in LDA   [] Wound Image Taken    Wound Care Consulted? No    Attending Nurse:  Nena CHILDS RN    Second RN/Staff Member:  Carmelina DUNAWAY RN

## 2024-08-31 NOTE — PT/OT/SLP PROGRESS
Physical Therapy      Patient Name:  Lana Martel   MRN:  [id number]    Patient not seen today secondary to bedrest per CVA protocol, awaiting MRI/neuro . Will follow-up tomorrow as appropriate.

## 2024-08-31 NOTE — CONSULTS
"Patient presents to outside hospital with symptoms including:   Paresthesias of the mouth in all 4 extremities   Dizziness/vertigo   Headache   Difficulty speaking    CT head was done at the outside facility.  She was transferred here for neurologic evaluation as well as MRI scanning.    MRI has not yet occurred     I have seen this patient in the remote past.  She has an aversion to MRI.    She has been back to baseline for many hours now and is more interested in discharge then any further testing.      Her sisters at the bedside and helps provide history  ...    Home med list reviewed    Exam:   BP (!) 144/75   Pulse 78   Temp 98.8 °F (37.1 °C) (Oral)   Resp 18   Ht 5' 2" (1.575 m)   Wt 103.2 kg (227 lb 8.2 oz)   SpO2 97%   BMI 41.61 kg/m²   Alert appropriate   Normal cranial nerves   Normal motor and coordination exams   Reportedly up and walking about earlier in the room     Imaging:  Head CT at outside facility interpreted as okay.  Images and report reviewed.    She has had at least 3 brain MRIs in the past none of which have shown acute ischemia    Prob list:   Possible atypical migraine.  Doubt TIA   Hyperventilation/panic in the differential but the patient does not really feel that this is likely    Plan/Rec's  She wants to be discharged and I think that is probably reasonable.  I will defer further testing or discharge to the hospitalist     I do not plan to return to see her but I am happy to give comments if additional imaging shows any pathology    Signing off    "

## 2024-08-31 NOTE — H&P
[facility] Hospital Medicine History & Physical Examination       Patient Name: Lana Martel  MRN: [id number]  Patient Class: IP- Inpatient   Admission Date: 8/31/2024  2:34 AM  Length of Stay: 0  Admitting Service: Hospital Medicine   Attending Physician: Gómez Proctor MD   Primary Care Provider: Carlos A Zamora MD  History source: EMR, patient and/or patient's family    CHIEF COMPLAINT   Dizziness    HISTORY OF PRESENT ILLNESS:   Patient is 64-year-old female with a history of asthma, vertigo on as needed meclizine, hypertension, hypothyroidism, TIAs and additional past medical history as below who presented to an outside ER complaining of significant dizziness and had a witnessed episode of expressive aphasia.  Patient was not on daily aspirin.    Workup at prior facility was unremarkable including CT head with no acute process.  However request was made to transfer to this facility for MRI to rule out posterior circulation CVA as cause of her worsening vertigo.  Patient arrives here with no complaints and no focal deficits on exam.    PAST MEDICAL HISTORY:     Past Medical History:   Diagnosis Date    Asthma     Coronary artery disease     Depression     GERD (gastroesophageal reflux disease)     Hypertension     Thyroid disease     TIA (transient ischemic attack)        PAST SURGICAL HISTORY:     Past Surgical History:   Procedure Laterality Date    APPENDECTOMY      CARDIAC SURGERY      CHOLECYSTECTOMY      HYSTERECTOMY      KNEE ARTHROSCOPY W/ ACL RECONSTRUCTION      TONSILLECTOMY         ALLERGIES:   Penicillins, Biaxin [clarithromycin], Celebrex [celecoxib], Compazine [prochlorperazine], Oxycodone-acetaminophen, Sulfa (sulfonamide antibiotics), Toprol xl [metoprolol succinate], Hydrochlorothiazide, and Percopap    FAMILY HISTORY:   Reviewed and non-contributory     SOCIAL HISTORY:   Screening for Social Drivers for health: Patient screened for food insecurity,  housing instability, transportation needs, utility   difficulties, and interpersonal safety (select all that apply as identified as concern)  []Housing or Food  []Transportation Needs  []Utility Difficulties  []Interpersonal safety  [x]None  Social History     Tobacco Use    Smoking status: Never    Smokeless tobacco: Never   Substance Use Topics    Alcohol use: Not on file        HOME MEDICATIONS:     Prior to Admission medications    Medication Sig   albuterol (PROVENTIL) 2.5 mg /3 mL (0.083 %) nebulizer solution Take 2.5 mg by nebulization every 6 (six) hours as needed.   ALPRAZolam (XANAX) 0.5 MG tablet Take 0.5 mg by mouth 3 (three) times daily.   atorvastatin (LIPITOR) 80 MG tablet Take 80 mg by mouth.   cloNIDine (CATAPRES) 0.1 MG tablet Take 0.1 mg by mouth 3 (three) times daily as needed (SBP >160).   ergocalciferol (ERGOCALCIFEROL) 50,000 unit Cap Take 50,000 Units by mouth every 7 days.   EScitalopram oxalate (LEXAPRO) 20 MG tablet Take 20 mg by mouth once daily.   ezetimibe (ZETIA) 10 mg tablet Take 10 mg by mouth once daily.   fluticasone-salmeterol diskus inhaler 250-50 mcg Inhale 1 puff into the lungs 2 (two) times daily.   isosorbide mononitrate (IMDUR) 30 MG 24 hr tablet Take 30 mg by mouth once daily.   lansoprazole (PREVACID) 30 MG capsule Take 30 mg by mouth once daily.   levothyroxine (SYNTHROID) 112 MCG tablet Take 112 mcg by mouth once daily.   montelukast (SINGULAIR) 10 mg tablet Take 10 mg by mouth nightly.   nebivoloL (BYSTOLIC) 10 MG Tab Take 10 mg by mouth once daily.   OZEMPIC 0.25 mg or 0.5 mg (2 mg/3 mL) pen injector Inject 0.25 mg into the skin every 7 days.   potassium chloride (MICRO-K) 10 MEQ CpSR Take 10 mEq by mouth 2 (two) times daily.   sucralfate (CARAFATE) 1 gram tablet Take 1 g by mouth.   traZODone (DESYREL) 100 MG tablet Take 200 mg by mouth nightly.   albuterol-ipratropium (DUO-NEB) 2.5 mg-0.5 mg/3 mL nebulizer solution Take 3 mLs by nebulization every 6 (six) hours as  "needed for Wheezing. Rescue   cyclobenzaprine (FLEXERIL) 10 MG tablet Take 10 mg by mouth 3 (three) times daily as needed.   dextromethorphan-guaiFENesin  mg (MUCINEX DM)  mg per 12 hr tablet Take 1 tablet by mouth once daily.   furosemide (LASIX) 40 MG tablet Take 40 mg by mouth.   HYDROcodone-acetaminophen (NORCO) 5-325 mg per tablet Take 1 tablet by mouth every 6 (six) hours as needed for Pain.   LYLLANA 0.05 mg/24 hr 1 patch twice a week.   meclizine (ANTIVERT) 25 MG tablet Take 25 mg by mouth 3 (three) times daily as needed.   nitroGLYCERIN (NITROSTAT) 0.4 MG SL tablet Place 0.4 mg under the tongue.   predniSONE (DELTASONE) 20 MG tablet Take 20 mg by mouth once daily.   ranolazine (RANEXA) 1,000 mg Tb12 Take 1,000 mg by mouth 2 (two) times daily.   traMADoL (ULTRAM) 50 mg tablet Take 1 tablet (50 mg total) by mouth every 6 (six) hours as needed for Pain.       REVIEW OF SYSTEMS:   Except as documented, all other systems reviewed and negative     PHYSICAL EXAM:   T 98.2 °F (36.8 °C)   BP (!) 166/84   P 78   RR 18   O2 97 %  GENERAL: awake, alert, oriented and in no acute distress, non-toxic appearing   HEENT: normocephalic atraumatic   NECK: supple   LUNGS: Clear bilaterally, no wheezing or rales, no accessory muscle use   CVS: Regular rate and rhythm, normal peripheral perfusion  ABD: Soft, non-tender, non-distended, bowel sounds present  EXTREMITIES: no clubbing or cyanosis  SKIN: Warm, dry.   NEURO: alert and oriented, grossly without focal deficits   PSYCHIATRIC: Cooperative    LABS AND IMAGING:     Recent Labs     08/30/24  1046   WBC 9.23   RBC 5.45*   HGB 13.7   HCT 42.5   MCV 78.0*   MCH 25.1*   MCHC 32.2*   RDW 14.9        No results for input(s): "LACTIC" in the last 72 hours.  No results for input(s): "INR", "APTT", "D-DIMER" in the last 72 hours.  Recent Labs     08/30/24  1117   HGBA1C 6.2      Recent Labs     08/30/24  1046      K 4.0   CO2 24   BUN 8.0*   CREATININE 1.00 "   GLUCOSE 93   CALCIUM 9.3   ALBUMIN 3.4   GLOBULIN 3.4   ALKPHOS 145   ALT 20   AST 14   BILITOT 0.3     Recent Labs     08/30/24  1046   BNP 72.4          CT Head Without Contrast  Narrative: EXAMINATION:  CT HEAD WITHOUT CONTRAST    CLINICAL HISTORY:  Transient ischemic attack (TIA);, .    TECHNIQUE:  PATIENT RADIATION DOSE: DLP(mGycm) 894    As per PQRS measures, all CT scans at this facility used dose modulation, iterative reconstruction, and/or weight based dose adjustment when appropriate to reduce radiation dose to as low as reasonably achievable.    COMPARISON:  02/20/2018    FINDINGS:  Serial axial images were obtained of the head without the administration of IV contrast. Both brain and bone parenchymal windows were obtained.  Additional coronal and sagittal reconstructions were obtained.  Ventricles, cisterns, and sulci are prominent in size.  There is no evidence of intracranial hemorrhage, midline shift, mass effect, or abnormal extra-axial fluid collections.  Mild scattered hypodensities are seen within the peripheral white matter suspicious for mild small vessel ischemic changes.  Intracranial atherosclerosis is identified.  Cerebellar tonsils extend caudally to the level of foramen magnum.  There is beam hardening artifact at the skull base.  There is mucosal thickening at the right maxillary sinus and to a lesser extent the left maxillary sinus.  Mastoid air cells are aerated bilaterally.  Filling defects are the external auditory canals bilaterally suggesting cerumen.  Clinical correlation is indicated.  Bony structures are osteopenic.  Impression: 1. No acute intracranial abnormality identified  2. Generalized cerebral and cerebellar atrophy  3. Findings and other details as above    Electronically signed by: Brian Bond  Date:    08/30/2024  Time:    12:25  X-Ray Chest AP Portable  Narrative: EXAMINATION:  XR CHEST AP PORTABLE    CLINICAL HISTORY:  , Dyspnea,  unspecified.    COMPARISON:  01/02/2024    FINDINGS:  An AP view or more reveals the heart to be borderline enlarged.  The trachea is midline.  No definite infiltrate or effusion is seen.  Atherosclerosis is seen within the aorta and Degenerative changes are noted to the thoracic spine.  Bony structures are osteopenic.  Impression: 1. Borderline cardiomegaly  2. Atherosclerosis  3. Thoracic spondylosis    Electronically signed by: Brian Bond  Date:    08/30/2024  Time:    12:05      ASSESSMENT & PLAN:   Vertigo-rule out posterior CVA  Mild asthma exacerbation    -place on stroke protocol  -neuro checks q4 hours, telemetry monitoring  -MRI brain, carotid ultrasound an echocardiogram  -permissive hypertension with parameters  -PT/OT/ST  -aspirin, statin at discharge  -neurology consultation    DVT prophylaxis: lovenox  Code status: full    If patient was admitted under observational status it is with my approval/permission.     At least 55 min was spent on this history and physical.  Time seen: 4AM   Gómez Proctor MD

## 2024-08-31 NOTE — PLAN OF CARE
Problem: Adult Inpatient Plan of Care  Goal: Plan of Care Review  Outcome: Met  Goal: Patient-Specific Goal (Individualized)  Outcome: Met  Goal: Absence of Hospital-Acquired Illness or Injury  Outcome: Met  Goal: Optimal Comfort and Wellbeing  Outcome: Met  Goal: Readiness for Transition of Care  Outcome: Met     Problem: Bariatric Environmental Safety  Goal: Safety Maintained with Care  Outcome: Met     Problem: Infection  Goal: Absence of Infection Signs and Symptoms  Outcome: Met     Problem: Stroke, Ischemic (Includes Transient Ischemic Attack)  Goal: Optimal Coping  Outcome: Met  Goal: Effective Bowel Elimination  Outcome: Met  Goal: Optimal Cerebral Tissue Perfusion  Outcome: Met  Goal: Optimal Cognitive Function  Outcome: Met  Goal: Improved Communication Skills  Outcome: Met  Goal: Optimal Functional Ability  Outcome: Met  Goal: Optimal Nutrition Intake  Outcome: Met  Goal: Effective Oxygenation and Ventilation  Outcome: Met  Goal: Improved Sensorimotor Function  Outcome: Met  Goal: Safe and Effective Swallow  Outcome: Met  Goal: Effective Urinary Elimination  Outcome: Met

## 2024-08-31 NOTE — DISCHARGE SUMMARY
[facility] [address] [facility]  Discharge Note  Short Stay    * No surgery found *    Patient is 64-year-old female with a history of asthma, vertigo on as needed meclizine, hypertension, hypothyroidism, TIAs and additional past medical history as below who presented to an outside ER complaining of significant dizziness and had a witnessed episode of expressive aphasia.  Patient was not on daily aspirin.     Workup at prior facility was unremarkable including CT head with no acute process.  However request was made to transfer to this facility for MRI to rule out posterior circulation CVA as cause of her worsening vertigo.  Patient arrives here with no complaints and no focal deficits on exam.  Patient was seen by Neurology Services who recommended that patient can be discharge in his cleared.  MRI was not done and not need per Neurology.  Patient denied any further concerns at this time.  Denied any further dizziness, confusion, or any focal deficits.  Patient will follow up with the PCP outpatient.  Advised patient to continue taking Lipitor daily along with starting aspirin daily.  Patient denied any further concerns at this time.      OUTCOME: Patient tolerated treatment/procedure well without complication and is now ready for discharge.    DISPOSITION: Admitted as an Inpatient    FINAL DIAGNOSIS:  Vertigo    FOLLOWUP: With primary care provider    DISCHARGE INSTRUCTIONS:    Discharge Procedure Orders   Diet Adult Regular     Notify your health care provider if you experience any of the following:  temperature >100.4     Notify your health care provider if you experience any of the following:  severe uncontrolled pain     Notify your health care provider if you experience any of the following:  redness, tenderness, or signs of infection (pain, swelling, redness, odor or green/yellow discharge around incision site)     Activity as tolerated        TIME SPENT ON DISCHARGE: 35 minutes

## 2024-08-31 NOTE — NURSING
Pt. Being discharged to home in stable condition. Pt. Going home via family vehicle in stable condition.

## 2024-08-31 NOTE — PT/OT/SLP EVAL
[facility]  Speech Language Pathology Department  Cognitive-Communication Evaluation    Patient Name:  Lana Martel   MRN:  [id number]    Recommendations     General recommendations:  SLP intervention not indicated  Communication strategies:  none    Discharge therapy intensity: No Therapy Indicated  Barriers to safe discharge: none    History     Lana Martel is a/n 64 y.o. female with history of TIA was admitted for dizziness and expressive aphasia.     Past Medical History:   Diagnosis Date    Asthma     Coronary artery disease     Depression     GERD (gastroesophageal reflux disease)     Hypertension     Thyroid disease     TIA (transient ischemic attack)      Past Surgical History:   Procedure Laterality Date    APPENDECTOMY      CARDIAC SURGERY      CHOLECYSTECTOMY      HYSTERECTOMY      KNEE ARTHROSCOPY W/ ACL RECONSTRUCTION      TONSILLECTOMY         Previous level of Function  Education:trade school  Occupation: part time job  Lives: alone  Handed: Right  Glasses: no  Hearing Aids: no    Subjective     Patient awake and alert.  Spiritual/Cultural/Oriental orthodox Beliefs/Practices that affect care:  10    Pain/Comfort:  0/10    Respiratory Status:  room air    Objective     ORAL MUSCULATURE  Dentition: own teeth  Facial Movement: WFL  Buccal Strength & Mobility: WFL  Mandibular Strength & Mobility: WFL  Oral Labial Strength & Mobility: WFL    SPEECH PRODUCTION  Phoneme Production: adequate  Voice Quality: adequate  Voice Production: adequate  Speech Rate: appropriate  Loudness: acceptable  Speech Intelligibility  Known Context: Greater that 90%  Unknown Context: Greater that 90%    AUDITORY COMPREHENSION  Identification:  Body parts: 100%  Objects: 100%  Following Directions:  1-Step: 100%  2-Step: 100%  Yes/No Questions:  Biographical: 100%  Environmental: 100%  Simple: 100%  Complex: 100%    VERBAL EXPRESSION  Automatic Speech:  Days of the week: 100%  Months of the year:  100%  Phrase Completion: 100%  Confrontation Naming  Body Parts: 100%  Objects: 100%  Wh- Questions:  Object name: 100%  Object function: 100%    COGNITION  Orientation:  Person: yes  Place: yes  Time: yes  Situation: yes   Attention:  Focused: Within Functional Limits  Sustained: Within Functional Limits    Memory:  Immediate: 100%  Delayed: 100%  Short Term: 100%  Long Term: 100%  Problem Solving  Functional simple: 100%  Functional complex: 100%  Organization:  Convergent thinkin%  Divergent thinkin%  Executive Function:  Planning: Within Functional Limits  Reasoning: Within Functional Limits    Assessment     Pt presents with functional speech and language skills, cognitive linguistic skills note to be at baseline. No skilled SLP intervention is warranted at this time.     Patient Education     Patient provided with verbal education regarding POC.  Understanding was verbalized.    Plan     Plan of Care reviewed with:    patient      Time Tracking     SLP Treatment Date:   24  Speech Start Time:  1000  Speech Stop Time:  1015     Speech Total Time (min):  15 min    Billable minutes:  Evaluation of Speech Sound Production with Comprehension and Expression, 15 minutes     2024

## 2024-08-31 NOTE — NURSING
Nurses Note -- 4 Eyes      8/31/2024   3:28 AM      Skin assessed during: Admit      [x] No Altered Skin Integrity Present    [x]Prevention Measures Documented      [] Yes- Altered Skin Integrity Present or Discovered   [] LDA Added if Not in Epic (Describe Wound)   [] New Altered Skin Integrity was Present on Admit and Documented in LDA   [] Wound Image Taken    Wound Care Consulted? No    Attending Nurse:   Carmelina Durbin RN/Staff Member:   Gustavo

## 2024-09-01 LAB
LEFT CCA DIST DIAS: 12 CM/S
LEFT CCA DIST SYS: 88 CM/S
LEFT CCA PROX DIAS: 12 CM/S
LEFT CCA PROX SYS: 89 CM/S
LEFT ECA DIAS: 3 CM/S
LEFT ECA SYS: 115 CM/S
LEFT ICA DIST DIAS: 18 CM/S
LEFT ICA DIST SYS: 103 CM/S
LEFT ICA MID DIAS: 16 CM/S
LEFT ICA MID SYS: 100 CM/S
LEFT ICA PROX DIAS: 16 CM/S
LEFT ICA PROX SYS: 113 CM/S
LEFT VERTEBRAL DIAS: 24 CM/S
LEFT VERTEBRAL SYS: 77 CM/S
OHS CV CAROTID RIGHT ICA EDV HIGHEST: 25
OHS CV CAROTID ULTRASOUND LEFT ICA/CCA RATIO: 1.28
OHS CV CAROTID ULTRASOUND RIGHT ICA/CCA RATIO: 0.98
OHS CV PV CAROTID LEFT HIGHEST CCA: 89
OHS CV PV CAROTID LEFT HIGHEST ICA: 113
OHS CV PV CAROTID RIGHT HIGHEST CCA: 97
OHS CV PV CAROTID RIGHT HIGHEST ICA: 95
OHS CV US CAROTID LEFT HIGHEST EDV: 18
RIGHT CCA DIST SYS: 97 CM/S
RIGHT CCA PROX DIAS: 11 CM/S
RIGHT CCA PROX SYS: 74 CM/S
RIGHT ECA SYS: 104 CM/S
RIGHT ICA DIST DIAS: 25 CM/S
RIGHT ICA DIST SYS: 95 CM/S
RIGHT ICA MID DIAS: 19 CM/S
RIGHT ICA MID SYS: 91 CM/S
RIGHT ICA PROX DIAS: 20 CM/S
RIGHT ICA PROX SYS: 75 CM/S
RIGHT VERTEBRAL SYS: 63 CM/S

## 2024-09-02 ENCOUNTER — PATIENT MESSAGE (OUTPATIENT)
Dept: ADMINISTRATIVE | Facility: OTHER | Age: 64
End: 2024-09-02
Payer: MEDICARE

## 2024-09-04 ENCOUNTER — PATIENT OUTREACH (OUTPATIENT)
Dept: ADMINISTRATIVE | Facility: CLINIC | Age: 64
End: 2024-09-04
Payer: MEDICARE

## 2024-09-04 NOTE — PROGRESS NOTES
C3 nurse attempted to contact Lana Martel  for a TCC post hospital discharge follow up call. No answer. Left voicemail with callback information. The patient does not have a scheduled HOSFU appointment. Unable to route message to PCP staff.

## 2024-09-05 NOTE — PROGRESS NOTES
2nd attempt made to reach patient for TCC call. Left voicemail please call [phone] leave first name, last, and date of birth for Matt. I will return your call.

## 2024-09-05 NOTE — PROGRESS NOTES
3rd attempt made to reach patient for TCC call. Pt sister stated pt is not available, will inform pt to return call.

## 2024-09-06 NOTE — UM SECONDARY REVIEW
64-year-old female with a previous history of vertigo was admitted with acute exacerbation.  Seen by Neurology who deemed that this was peripheral vertigo and not central.  Remained hemodynamically stable, afebrile, without any orthostatic hypotension, without any food/medication intolerance, without any functional impairment, without any renal failure, electrolyte derangement.  No evidence of acute CVA.  Care could have been provided under observation setting.       Jeffery Bass MD  Utilization Management  Physician Advisor  [facility]  09/06/2024

## 2025-03-25 DIAGNOSIS — Z12.31 SCREENING MAMMOGRAM, ENCOUNTER FOR: Primary | ICD-10-CM

## 2025-03-25 DIAGNOSIS — M81.0 OP (OSTEOPOROSIS): ICD-10-CM

## 2025-04-03 ENCOUNTER — HOSPITAL ENCOUNTER (OUTPATIENT)
Dept: RADIOLOGY | Facility: HOSPITAL | Age: 65
Discharge: HOME | End: 2025-04-03
Attending: INTERNAL MEDICINE
Payer: MEDICARE

## 2025-04-03 DIAGNOSIS — Z12.31 SCREENING MAMMOGRAM, ENCOUNTER FOR: ICD-10-CM

## 2025-04-03 DIAGNOSIS — M81.0 OP (OSTEOPOROSIS): ICD-10-CM

## 2025-04-03 PROCEDURE — 77067 SCR MAMMO BI INCL CAD: CPT | Mod: TC

## 2025-04-03 PROCEDURE — 77080 DXA BONE DENSITY AXIAL: CPT | Mod: TC
